# Patient Record
Sex: MALE | Race: WHITE | NOT HISPANIC OR LATINO | Employment: OTHER | URBAN - METROPOLITAN AREA
[De-identification: names, ages, dates, MRNs, and addresses within clinical notes are randomized per-mention and may not be internally consistent; named-entity substitution may affect disease eponyms.]

---

## 2021-06-05 ENCOUNTER — APPOINTMENT (OUTPATIENT)
Dept: MRI IMAGING | Facility: HOSPITAL | Age: 71
End: 2021-06-05

## 2021-06-05 ENCOUNTER — APPOINTMENT (OUTPATIENT)
Dept: CT IMAGING | Facility: HOSPITAL | Age: 71
End: 2021-06-05

## 2021-06-05 ENCOUNTER — HOSPITAL ENCOUNTER (INPATIENT)
Facility: HOSPITAL | Age: 71
LOS: 1 days | Discharge: HOME | End: 2021-06-09
Attending: INTERNAL MEDICINE | Admitting: INTERNAL MEDICINE

## 2021-06-05 ENCOUNTER — APPOINTMENT (OUTPATIENT)
Dept: GENERAL RADIOLOGY | Facility: HOSPITAL | Age: 71
End: 2021-06-05

## 2021-06-05 DIAGNOSIS — R20.0 NUMBNESS AND TINGLING IN LEFT ARM: ICD-10-CM

## 2021-06-05 DIAGNOSIS — N28.9 ACUTE RENAL IMPAIRMENT: ICD-10-CM

## 2021-06-05 DIAGNOSIS — R20.2 NUMBNESS AND TINGLING IN LEFT ARM: ICD-10-CM

## 2021-06-05 DIAGNOSIS — R73.9 HYPERGLYCEMIA: ICD-10-CM

## 2021-06-05 DIAGNOSIS — R20.0 LEFT FACIAL NUMBNESS: Primary | ICD-10-CM

## 2021-06-05 LAB
ALBUMIN SERPL-MCNC: 3.9 G/DL (ref 3.5–5.2)
ALBUMIN/GLOB SERPL: 1.4 G/DL
ALP SERPL-CCNC: 118 U/L (ref 39–117)
ALT SERPL W P-5'-P-CCNC: 27 U/L (ref 1–41)
ANION GAP SERPL CALCULATED.3IONS-SCNC: 13 MMOL/L (ref 5–15)
ARTERIAL PATENCY WRIST A: POSITIVE
AST SERPL-CCNC: 19 U/L (ref 1–40)
ATMOSPHERIC PRESS: ABNORMAL MM[HG]
BASE EXCESS BLDA CALC-SCNC: -2.2 MMOL/L (ref 0–3)
BASOPHILS # BLD AUTO: 0 10*3/MM3 (ref 0–0.2)
BASOPHILS NFR BLD AUTO: 0.4 % (ref 0–1.5)
BDY SITE: ABNORMAL
BILIRUB SERPL-MCNC: 0.4 MG/DL (ref 0–1.2)
BILIRUB UR QL STRIP: NEGATIVE
BUN SERPL-MCNC: 36 MG/DL (ref 8–23)
BUN/CREAT SERPL: 16.7 (ref 7–25)
CALCIUM SPEC-SCNC: 8.4 MG/DL (ref 8.6–10.5)
CHLORIDE SERPL-SCNC: 95 MMOL/L (ref 98–107)
CLARITY UR: CLEAR
CO2 BLDA-SCNC: 25.2 MMOL/L (ref 22–29)
CO2 SERPL-SCNC: 22 MMOL/L (ref 22–29)
COLOR UR: YELLOW
CREAT SERPL-MCNC: 2.16 MG/DL (ref 0.76–1.27)
DEPRECATED RDW RBC AUTO: 41.1 FL (ref 37–54)
EOSINOPHIL # BLD AUTO: 0.1 10*3/MM3 (ref 0–0.4)
EOSINOPHIL NFR BLD AUTO: 1.8 % (ref 0.3–6.2)
ERYTHROCYTE [DISTWIDTH] IN BLOOD BY AUTOMATED COUNT: 13.6 % (ref 12.3–15.4)
GFR SERPL CREATININE-BSD FRML MDRD: 30 ML/MIN/1.73
GLOBULIN UR ELPH-MCNC: 2.7 GM/DL
GLUCOSE BLDC GLUCOMTR-MCNC: 261 MG/DL (ref 70–105)
GLUCOSE BLDC GLUCOMTR-MCNC: 368 MG/DL (ref 70–105)
GLUCOSE BLDC GLUCOMTR-MCNC: 405 MG/DL (ref 70–105)
GLUCOSE SERPL-MCNC: 519 MG/DL (ref 65–99)
GLUCOSE UR STRIP-MCNC: ABNORMAL MG/DL
HCO3 BLDA-SCNC: 23.8 MMOL/L (ref 21–28)
HCT VFR BLD AUTO: 37.8 % (ref 37.5–51)
HEMODILUTION: NO
HGB BLD-MCNC: 12.3 G/DL (ref 13–17.7)
HGB UR QL STRIP.AUTO: NEGATIVE
HOLD SPECIMEN: NORMAL
HOLD SPECIMEN: NORMAL
INHALED O2 CONCENTRATION: 21 %
KETONES UR QL STRIP: NEGATIVE
LEUKOCYTE ESTERASE UR QL STRIP.AUTO: NEGATIVE
LYMPHOCYTES # BLD AUTO: 0.8 10*3/MM3 (ref 0.7–3.1)
LYMPHOCYTES NFR BLD AUTO: 13.6 % (ref 19.6–45.3)
MCH RBC QN AUTO: 28.3 PG (ref 26.6–33)
MCHC RBC AUTO-ENTMCNC: 32.4 G/DL (ref 31.5–35.7)
MCV RBC AUTO: 87.5 FL (ref 79–97)
MODALITY: ABNORMAL
MONOCYTES # BLD AUTO: 0.5 10*3/MM3 (ref 0.1–0.9)
MONOCYTES NFR BLD AUTO: 7.7 % (ref 5–12)
NEUTROPHILS NFR BLD AUTO: 4.6 10*3/MM3 (ref 1.7–7)
NEUTROPHILS NFR BLD AUTO: 76.5 % (ref 42.7–76)
NITRITE UR QL STRIP: NEGATIVE
NRBC BLD AUTO-RTO: 0 /100 WBC (ref 0–0.2)
PCO2 BLDA: 45.1 MM HG (ref 35–48)
PH BLDA: 7.33 PH UNITS (ref 7.35–7.45)
PH UR STRIP.AUTO: 7 [PH] (ref 5–8)
PLATELET # BLD AUTO: 145 10*3/MM3 (ref 140–450)
PMV BLD AUTO: 9.5 FL (ref 6–12)
PO2 BLDA: 34.8 MM HG (ref 83–108)
POTASSIUM SERPL-SCNC: 4.9 MMOL/L (ref 3.5–5.2)
PROT SERPL-MCNC: 6.6 G/DL (ref 6–8.5)
PROT UR QL STRIP: NEGATIVE
RBC # BLD AUTO: 4.32 10*6/MM3 (ref 4.14–5.8)
SAO2 % BLDCOA: 62.4 % (ref 94–98)
SODIUM SERPL-SCNC: 130 MMOL/L (ref 136–145)
SP GR UR STRIP: 1.02 (ref 1–1.03)
TROPONIN T SERPL-MCNC: 0.02 NG/ML (ref 0–0.03)
UROBILINOGEN UR QL STRIP: ABNORMAL
WBC # BLD AUTO: 6 10*3/MM3 (ref 3.4–10.8)
WHOLE BLOOD HOLD SPECIMEN: NORMAL

## 2021-06-05 PROCEDURE — 85025 COMPLETE CBC W/AUTO DIFF WBC: CPT | Performed by: NURSE PRACTITIONER

## 2021-06-05 PROCEDURE — 82962 GLUCOSE BLOOD TEST: CPT

## 2021-06-05 PROCEDURE — 70551 MRI BRAIN STEM W/O DYE: CPT

## 2021-06-05 PROCEDURE — 70498 CT ANGIOGRAPHY NECK: CPT

## 2021-06-05 PROCEDURE — 36600 WITHDRAWAL OF ARTERIAL BLOOD: CPT

## 2021-06-05 PROCEDURE — 72072 X-RAY EXAM THORAC SPINE 3VWS: CPT

## 2021-06-05 PROCEDURE — 72040 X-RAY EXAM NECK SPINE 2-3 VW: CPT

## 2021-06-05 PROCEDURE — 99285 EMERGENCY DEPT VISIT HI MDM: CPT

## 2021-06-05 PROCEDURE — 63710000001 INSULIN GLARGINE PER 5 UNITS: Performed by: INTERNAL MEDICINE

## 2021-06-05 PROCEDURE — 63710000001 INSULIN REGULAR HUMAN PER 5 UNITS: Performed by: NURSE PRACTITIONER

## 2021-06-05 PROCEDURE — 70450 CT HEAD/BRAIN W/O DYE: CPT

## 2021-06-05 PROCEDURE — 0 IOPAMIDOL PER 1 ML: Performed by: NURSE PRACTITIONER

## 2021-06-05 PROCEDURE — 63710000001 INSULIN LISPRO (HUMAN) PER 5 UNITS: Performed by: INTERNAL MEDICINE

## 2021-06-05 PROCEDURE — 81003 URINALYSIS AUTO W/O SCOPE: CPT | Performed by: NURSE PRACTITIONER

## 2021-06-05 PROCEDURE — G0378 HOSPITAL OBSERVATION PER HR: HCPCS

## 2021-06-05 PROCEDURE — 25010000002 HYDROMORPHONE PER 4 MG: Performed by: NURSE PRACTITIONER

## 2021-06-05 PROCEDURE — 84484 ASSAY OF TROPONIN QUANT: CPT | Performed by: NURSE PRACTITIONER

## 2021-06-05 PROCEDURE — 25010000002 ONDANSETRON PER 1 MG: Performed by: NURSE PRACTITIONER

## 2021-06-05 PROCEDURE — 82803 BLOOD GASES ANY COMBINATION: CPT

## 2021-06-05 PROCEDURE — 99220 PR INITIAL OBSERVATION CARE/DAY 70 MINUTES: CPT | Performed by: INTERNAL MEDICINE

## 2021-06-05 PROCEDURE — 80053 COMPREHEN METABOLIC PANEL: CPT | Performed by: NURSE PRACTITIONER

## 2021-06-05 PROCEDURE — 99222 1ST HOSP IP/OBS MODERATE 55: CPT | Performed by: PSYCHIATRY & NEUROLOGY

## 2021-06-05 PROCEDURE — 70496 CT ANGIOGRAPHY HEAD: CPT

## 2021-06-05 PROCEDURE — 4A03X5D MEASUREMENT OF ARTERIAL FLOW, INTRACRANIAL, EXTERNAL APPROACH: ICD-10-PCS | Performed by: RADIOLOGY

## 2021-06-05 PROCEDURE — 72141 MRI NECK SPINE W/O DYE: CPT

## 2021-06-05 PROCEDURE — 93005 ELECTROCARDIOGRAM TRACING: CPT | Performed by: NURSE PRACTITIONER

## 2021-06-05 PROCEDURE — 25010000002 ENOXAPARIN PER 10 MG: Performed by: INTERNAL MEDICINE

## 2021-06-05 RX ORDER — ONDANSETRON 2 MG/ML
4 INJECTION INTRAMUSCULAR; INTRAVENOUS ONCE
Status: COMPLETED | OUTPATIENT
Start: 2021-06-05 | End: 2021-06-05

## 2021-06-05 RX ORDER — CITALOPRAM 20 MG/1
10 TABLET ORAL DAILY
Status: DISCONTINUED | OUTPATIENT
Start: 2021-06-06 | End: 2021-06-09 | Stop reason: HOSPADM

## 2021-06-05 RX ORDER — FUROSEMIDE 20 MG/1
20 TABLET ORAL DAILY
COMMUNITY

## 2021-06-05 RX ORDER — SODIUM CHLORIDE 0.9 % (FLUSH) 0.9 %
10 SYRINGE (ML) INJECTION AS NEEDED
Status: DISCONTINUED | OUTPATIENT
Start: 2021-06-05 | End: 2021-06-09 | Stop reason: HOSPADM

## 2021-06-05 RX ORDER — ASPIRIN 81 MG/1
81 TABLET, CHEWABLE ORAL DAILY
Status: DISCONTINUED | OUTPATIENT
Start: 2021-06-06 | End: 2021-06-09 | Stop reason: HOSPADM

## 2021-06-05 RX ORDER — ASPIRIN 81 MG/1
81 TABLET, CHEWABLE ORAL DAILY
COMMUNITY

## 2021-06-05 RX ORDER — ASPIRIN 300 MG/1
300 SUPPOSITORY RECTAL ONCE
Status: COMPLETED | OUTPATIENT
Start: 2021-06-05 | End: 2021-06-05

## 2021-06-05 RX ORDER — AMLODIPINE BESYLATE 5 MG/1
10 TABLET ORAL DAILY
Status: DISCONTINUED | OUTPATIENT
Start: 2021-06-06 | End: 2021-06-09 | Stop reason: HOSPADM

## 2021-06-05 RX ORDER — INSULIN LISPRO 100 [IU]/ML
0-14 INJECTION, SOLUTION INTRAVENOUS; SUBCUTANEOUS AS NEEDED
Status: DISCONTINUED | OUTPATIENT
Start: 2021-06-05 | End: 2021-06-09 | Stop reason: HOSPADM

## 2021-06-05 RX ORDER — AMLODIPINE BESYLATE 10 MG/1
10 TABLET ORAL DAILY
COMMUNITY

## 2021-06-05 RX ORDER — ONDANSETRON 2 MG/ML
4 INJECTION INTRAMUSCULAR; INTRAVENOUS EVERY 6 HOURS PRN
Status: DISCONTINUED | OUTPATIENT
Start: 2021-06-05 | End: 2021-06-09 | Stop reason: HOSPADM

## 2021-06-05 RX ORDER — CITALOPRAM 20 MG/1
10 TABLET ORAL DAILY
COMMUNITY

## 2021-06-05 RX ORDER — CLOPIDOGREL BISULFATE 75 MG/1
75 TABLET ORAL DAILY
Status: DISCONTINUED | OUTPATIENT
Start: 2021-06-06 | End: 2021-06-09 | Stop reason: HOSPADM

## 2021-06-05 RX ORDER — INSULIN LISPRO 100 [IU]/ML
5 INJECTION, SOLUTION INTRAVENOUS; SUBCUTANEOUS
Status: DISCONTINUED | OUTPATIENT
Start: 2021-06-06 | End: 2021-06-09 | Stop reason: HOSPADM

## 2021-06-05 RX ORDER — HYDROMORPHONE HCL 110MG/55ML
1 PATIENT CONTROLLED ANALGESIA SYRINGE INTRAVENOUS ONCE
Status: COMPLETED | OUTPATIENT
Start: 2021-06-05 | End: 2021-06-05

## 2021-06-05 RX ORDER — HYDROCODONE BITARTRATE AND ACETAMINOPHEN 7.5; 325 MG/1; MG/1
1 TABLET ORAL EVERY 4 HOURS PRN
Status: DISCONTINUED | OUTPATIENT
Start: 2021-06-05 | End: 2021-06-09 | Stop reason: HOSPADM

## 2021-06-05 RX ORDER — SODIUM CHLORIDE 9 MG/ML
75 INJECTION, SOLUTION INTRAVENOUS CONTINUOUS
Status: DISCONTINUED | OUTPATIENT
Start: 2021-06-05 | End: 2021-06-09 | Stop reason: HOSPADM

## 2021-06-05 RX ORDER — ACETAMINOPHEN 325 MG/1
650 TABLET ORAL EVERY 4 HOURS PRN
Status: DISCONTINUED | OUTPATIENT
Start: 2021-06-05 | End: 2021-06-09 | Stop reason: HOSPADM

## 2021-06-05 RX ORDER — GABAPENTIN 100 MG/1
100 CAPSULE ORAL EVERY 8 HOURS SCHEDULED
Status: DISCONTINUED | OUTPATIENT
Start: 2021-06-05 | End: 2021-06-07

## 2021-06-05 RX ORDER — ASPIRIN 325 MG
325 TABLET ORAL DAILY
Status: DISCONTINUED | OUTPATIENT
Start: 2021-06-05 | End: 2021-06-05

## 2021-06-05 RX ORDER — ATORVASTATIN CALCIUM 40 MG/1
40 TABLET, FILM COATED ORAL NIGHTLY
COMMUNITY

## 2021-06-05 RX ORDER — CLONIDINE HYDROCHLORIDE 0.1 MG/1
0.1 TABLET ORAL 2 TIMES DAILY
COMMUNITY

## 2021-06-05 RX ORDER — CLONIDINE HYDROCHLORIDE 0.1 MG/1
0.2 TABLET ORAL EVERY 8 HOURS SCHEDULED
Status: DISCONTINUED | OUTPATIENT
Start: 2021-06-05 | End: 2021-06-09 | Stop reason: HOSPADM

## 2021-06-05 RX ORDER — CLOPIDOGREL BISULFATE 75 MG/1
75 TABLET ORAL DAILY
COMMUNITY

## 2021-06-05 RX ORDER — PANTOPRAZOLE SODIUM 40 MG/1
40 TABLET, DELAYED RELEASE ORAL
Status: DISCONTINUED | OUTPATIENT
Start: 2021-06-06 | End: 2021-06-09 | Stop reason: HOSPADM

## 2021-06-05 RX ORDER — ATORVASTATIN CALCIUM 40 MG/1
40 TABLET, FILM COATED ORAL NIGHTLY
Status: DISCONTINUED | OUTPATIENT
Start: 2021-06-05 | End: 2021-06-09 | Stop reason: HOSPADM

## 2021-06-05 RX ORDER — PANTOPRAZOLE SODIUM 40 MG/1
40 TABLET, DELAYED RELEASE ORAL DAILY
COMMUNITY

## 2021-06-05 RX ORDER — SODIUM CHLORIDE 0.9 % (FLUSH) 0.9 %
10 SYRINGE (ML) INJECTION EVERY 12 HOURS SCHEDULED
Status: DISCONTINUED | OUTPATIENT
Start: 2021-06-05 | End: 2021-06-09 | Stop reason: HOSPADM

## 2021-06-05 RX ORDER — NICOTINE POLACRILEX 4 MG
15 LOZENGE BUCCAL
Status: DISCONTINUED | OUTPATIENT
Start: 2021-06-05 | End: 2021-06-09 | Stop reason: HOSPADM

## 2021-06-05 RX ORDER — INSULIN GLARGINE 100 [IU]/ML
45 INJECTION, SOLUTION SUBCUTANEOUS NIGHTLY
Status: DISCONTINUED | OUTPATIENT
Start: 2021-06-05 | End: 2021-06-09 | Stop reason: HOSPADM

## 2021-06-05 RX ORDER — INSULIN LISPRO 100 [IU]/ML
0-14 INJECTION, SOLUTION INTRAVENOUS; SUBCUTANEOUS
Status: DISCONTINUED | OUTPATIENT
Start: 2021-06-05 | End: 2021-06-09 | Stop reason: HOSPADM

## 2021-06-05 RX ORDER — DEXTROSE MONOHYDRATE 25 G/50ML
25 INJECTION, SOLUTION INTRAVENOUS
Status: DISCONTINUED | OUTPATIENT
Start: 2021-06-05 | End: 2021-06-09 | Stop reason: HOSPADM

## 2021-06-05 RX ADMIN — IOPAMIDOL 100 ML: 755 INJECTION, SOLUTION INTRAVENOUS at 17:03

## 2021-06-05 RX ADMIN — SODIUM CHLORIDE 1000 ML: 9 INJECTION, SOLUTION INTRAVENOUS at 17:09

## 2021-06-05 RX ADMIN — INSULIN GLARGINE 45 UNITS: 100 INJECTION, SOLUTION SUBCUTANEOUS at 23:07

## 2021-06-05 RX ADMIN — HYDROMORPHONE HYDROCHLORIDE 1 MG: 2 INJECTION, SOLUTION INTRAMUSCULAR; INTRAVENOUS; SUBCUTANEOUS at 19:01

## 2021-06-05 RX ADMIN — INSULIN LISPRO 8 UNITS: 100 INJECTION, SOLUTION INTRAVENOUS; SUBCUTANEOUS at 23:06

## 2021-06-05 RX ADMIN — ASPIRIN 300 MG: 300 SUPPOSITORY RECTAL at 20:05

## 2021-06-05 RX ADMIN — Medication 10 ML: at 23:06

## 2021-06-05 RX ADMIN — ONDANSETRON 4 MG: 2 INJECTION INTRAMUSCULAR; INTRAVENOUS at 19:01

## 2021-06-05 RX ADMIN — SODIUM CHLORIDE 75 ML/HR: 9 INJECTION, SOLUTION INTRAVENOUS at 23:12

## 2021-06-05 RX ADMIN — INSULIN HUMAN 8 UNITS: 100 INJECTION, SOLUTION PARENTERAL at 17:49

## 2021-06-05 RX ADMIN — SODIUM CHLORIDE 1000 ML: 9 INJECTION, SOLUTION INTRAVENOUS at 17:49

## 2021-06-05 RX ADMIN — ENOXAPARIN SODIUM 40 MG: 40 INJECTION SUBCUTANEOUS at 23:06

## 2021-06-05 NOTE — CONSULTS
Primary Care Provider: Crow Copeland DO     Consult requested by: RONAN Gonzalez    Reason for Consultation: Neurological evaluation for acute stroke,  Code stroke    Papito Gallo is a 70 y.o. male *    History taken from: patient chart RN    Chief complaint: left sided numbness and tingling.        SUBJECTIVE:    History of present illness:  The patient is a 70 year old gentleman without any past medical history on file who presented to ER of [facility] secondary to pain in the cervical and thoracic spine region.  It started about a week ago and gotten worse.  He was seen by a chiropractic physician on last Tuesday and Thursday. His treatment did not helped the pain. The pain is described as being severe and sharp.  For the last 2 days he has noticed numbness over left forearm and 4th and 5th ring fingers.  Today around 11:00 am he noticed numbness and tingling around left side of mouth.  The patient came to ER of [facility].  A code stroke was called.  He was not in window period to receive tPA.  The emergent CT of Head and CTA of Head and Neck reviewed by me were unremarkable.  The patient denies any speech and swallowing problems and focal weakness.     Review of Systems   Constitutional: Negative  HENT: Negative.    Eyes: Negative.    Respiratory: Negative.    Cardiovascular: Negative.    Gastrointestinal: Negative.    Genitourinary: Negative.    Musculoskeletal: pain in the upper back  Skin: Negative.    Neurological: sensory paresthesias.    Hematological: Negative.    Psychiatric/Behavioral: Negative.        PATIENT HISTORY:  No past medical history on file., No past surgical history on file., No family history on file.,   Social History     Tobacco Use   • Smoking status: Not on file   Substance Use Topics   • Alcohol use: Not on file   • Drug use: Not on file   , (Not in a hospital admission)  , Scheduled Meds:  insulin regular, 8 Units, Intravenous, Once  sodium chloride, 1,000 mL, Intravenous,  Once    , Continuous Infusions:   , PRN Meds:  sodium chloride  •  [COMPLETED] Insert peripheral IV **AND** sodium chloride, Allergies:  Bactrim [sulfamethoxazole-trimethoprim]    ________________________________________________________        OBJECTIVE:  Upon today's exam:  The patient is lying in bed in no apparent distress.  Head NC, AT, Neck supple, trachea midline.  Lungs CTA, good pulmonary effort.  CV  S1-S2 no murmur.  Abdomen soft. Non tender.  Ext no edema.          Neurologic Exam  The patient is awake, alert, oriented x 3, speech is fluent with good comprehension.  Follow commands, name common objects.  CN VFFC, EOMI, no facial droop. Facial sensation intact on gross finger rub.  Tongue midline.  Motor 5/5.  Sensory light touch intact.  Reflexes +, plantar mute.  Cerebellum finger to nose intact.   ________________________________________________________   RESULTS REVIEW:    VITAL SIGNS:   Temp:  [98.7 °F (37.1 °C)] 98.7 °F (37.1 °C)  Heart Rate:  [69-74] 69  Resp:  [22] 22  BP: (129-135)/(58-97) 129/58     LABS:  WBC   Date Value Ref Range Status   06/05/2021 6.00 3.40 - 10.80 10*3/mm3 Final     RBC   Date Value Ref Range Status   06/05/2021 4.32 4.14 - 5.80 10*6/mm3 Final     Hemoglobin   Date Value Ref Range Status   06/05/2021 12.3 (L) 13.0 - 17.7 g/dL Final     Hematocrit   Date Value Ref Range Status   06/05/2021 37.8 37.5 - 51.0 % Final     MCV   Date Value Ref Range Status   06/05/2021 87.5 79.0 - 97.0 fL Final     MCH   Date Value Ref Range Status   06/05/2021 28.3 26.6 - 33.0 pg Final     MCHC   Date Value Ref Range Status   06/05/2021 32.4 31.5 - 35.7 g/dL Final     RDW   Date Value Ref Range Status   06/05/2021 13.6 12.3 - 15.4 % Final     RDW-SD   Date Value Ref Range Status   06/05/2021 41.1 37.0 - 54.0 fl Final     MPV   Date Value Ref Range Status   06/05/2021 9.5 6.0 - 12.0 fL Final     Platelets   Date Value Ref Range Status   06/05/2021 145 140 - 450 10*3/mm3 Final     Neutrophil %    Date Value Ref Range Status   06/05/2021 76.5 (H) 42.7 - 76.0 % Final     Lymphocyte %   Date Value Ref Range Status   06/05/2021 13.6 (L) 19.6 - 45.3 % Final     Monocyte %   Date Value Ref Range Status   06/05/2021 7.7 5.0 - 12.0 % Final     Eosinophil %   Date Value Ref Range Status   06/05/2021 1.8 0.3 - 6.2 % Final     Basophil %   Date Value Ref Range Status   06/05/2021 0.4 0.0 - 1.5 % Final     Neutrophils, Absolute   Date Value Ref Range Status   06/05/2021 4.60 1.70 - 7.00 10*3/mm3 Final     Lymphocytes, Absolute   Date Value Ref Range Status   06/05/2021 0.80 0.70 - 3.10 10*3/mm3 Final     Monocytes, Absolute   Date Value Ref Range Status   06/05/2021 0.50 0.10 - 0.90 10*3/mm3 Final     Eosinophils, Absolute   Date Value Ref Range Status   06/05/2021 0.10 0.00 - 0.40 10*3/mm3 Final     Basophils, Absolute   Date Value Ref Range Status   06/05/2021 0.00 0.00 - 0.20 10*3/mm3 Final     nRBC   Date Value Ref Range Status   06/05/2021 0.0 0.0 - 0.2 /100 WBC Final     Glucose   Date Value Ref Range Status   06/05/2021 519 (C) 65 - 99 mg/dL Final     BUN   Date Value Ref Range Status   06/05/2021 36 (H) 8 - 23 mg/dL Final     Creatinine   Date Value Ref Range Status   06/05/2021 2.16 (H) 0.76 - 1.27 mg/dL Final     Sodium   Date Value Ref Range Status   06/05/2021 130 (L) 136 - 145 mmol/L Final     Potassium   Date Value Ref Range Status   06/05/2021 4.9 3.5 - 5.2 mmol/L Final     Chloride   Date Value Ref Range Status   06/05/2021 95 (L) 98 - 107 mmol/L Final     CO2   Date Value Ref Range Status   06/05/2021 22.0 22.0 - 29.0 mmol/L Final     Calcium   Date Value Ref Range Status   06/05/2021 8.4 (L) 8.6 - 10.5 mg/dL Final     Total Protein   Date Value Ref Range Status   06/05/2021 6.6 6.0 - 8.5 g/dL Final     Albumin   Date Value Ref Range Status   06/05/2021 3.90 3.50 - 5.20 g/dL Final     ALT (SGPT)   Date Value Ref Range Status   06/05/2021 27 1 - 41 U/L Final     AST (SGOT)   Date Value Ref Range Status    06/05/2021 19 1 - 40 U/L Final     Alkaline Phosphatase   Date Value Ref Range Status   06/05/2021 118 (H) 39 - 117 U/L Final     Total Bilirubin   Date Value Ref Range Status   06/05/2021 0.4 0.0 - 1.2 mg/dL Final     eGFR Non  Amer   Date Value Ref Range Status   06/05/2021 30 (L) >60 mL/min/1.73 Final     BUN/Creatinine Ratio   Date Value Ref Range Status   06/05/2021 16.7 7.0 - 25.0 Final     Anion Gap   Date Value Ref Range Status   06/05/2021 13.0 5.0 - 15.0 mmol/L Final       Lab Results   Component Value Date    HGBA1C 9.1 (H) 04/07/2021         IMAGING STUDIES:  XR Spine Cervical 2 or 3 View    Result Date: 6/5/2021  No acute osseous abnormality within the visualized spine. Mild to moderate multilevel degenerative changes are present throughout the spine.  Electronically Signed By-Vangie Thapa MD On:6/5/2021 5:19 PM This report was finalized on [id number] by  Vangie Thapa MD.    XR Spine Thoracic 3 View    Result Date: 6/5/2021  No acute osseous abnormality. Mild to moderate degenerative changes are present.  Electronically Signed By-Vangie Thapa MD On:6/5/2021 5:19 PM This report was finalized on [id number] by  Vangei Thapa MD.    CT Head Without Contrast    Result Date: 6/5/2021  No evidence of hemorrhage, mass effect or midline shift. No acute process identified.  Electronically Signed By-Vangie Thapa MD On:6/5/2021 5:20 PM This report was finalized on [id number] by  Vangie Thapa MD.      I reviewed the patient's new clinical results.      ________________________________________________________     PROBLEM LIST:    * No active hospital problems. *          Assessment/Plan   ASSESSMENT/PLAN:  The patient is a 70 year old gentleman without any significant medical history on record who has been having pain in the upper back and developed numbness in the left arm in ulnar distribution.  He also has had numbness of the left side of lip that developed around 11:00 am today.  The patient  may have small right sided lacunar infraction.  He was not a candidate for tPA secondary to being out of window period at the time of presentation.  The other diagnostic considerations include cervical radiculopathy. The blood work revealed blood sugar more than 500 and HbA1C above 8.   Rec: MRI of Brain without contrast.  MRI of Cervical spine without contrast.  2-D echocardiogram.  Blood work including Vitamin B12, TSH, lipid profile.  Start  mg po q day.  Will need work up for diabetes as per hospitalist MD.  Will follow.     Modification of stroke risk factors:   - Blood pressure should be less than 130/80 outpatient, HbA1c less than 6.5, LDL less than 70; b12>500 and smoking cessation if applicable. We would be grateful if the primary team / primary care physician would keep a close watch on the above targets.  - Stroke education  - Follow up with neurologist of choice      I discussed the patient's findings and my recommendations with patient and nursing staff    Bushra Lin MD  06/05/21  17:44 EDT

## 2021-06-05 NOTE — ED PROVIDER NOTES
Subjective   History:    70-year-old male presents the emergency department today with complaints of cervical and thoracic spine pain that has been present for the last week and has been getting worse.  Patient reports he has been going to the chiropractor and saw him on Tuesday and Thursday for this pain that is between his shoulder blades.  It has not helped.  Today he has 3 fingers on the left arm that are numb and tingly in the left side of his bottom lip is numb as well.  Patient has a history of a lumbar fusion in 1998 but no prior history of pain in his neck or thoracic spine.  Patient has a history of hyperlipidemia, hypertension, diabetes.  No history of stroke.  Patient also had polio when he was younger and has chronic weakness of the left lower extremity.  Patient reports mild headache.  States that the symptoms started sometime after 11 AM.  He was at a memorial service for his grandson.  Cannot give me a specific time.    Onset: 1 week  Location: Cervical, thoracic spine  Duration: Continuous  Character: Sharp  Aggravating/Alleviating factors: Exacerbated with movement, no identified alleviating factors  Radiation:, Left arm  Severity: Severe            Review of Systems   Constitutional: Negative for appetite change, chills, fatigue and fever.   HENT: Negative for congestion, facial swelling, sinus pain and sore throat.    Eyes: Negative for pain and visual disturbance.   Respiratory: Negative for cough, chest tightness and shortness of breath.    Cardiovascular: Negative for chest pain, palpitations and leg swelling.   Gastrointestinal: Negative for constipation, diarrhea, nausea and vomiting.   Genitourinary: Negative for dysuria, flank pain, frequency and urgency.   Musculoskeletal: Positive for back pain. Negative for arthralgias, joint swelling and neck pain.   Skin: Negative for color change and rash.   Neurological: Positive for numbness. Negative for dizziness, seizures, syncope, weakness,  light-headedness and headaches.       No past medical history on file.    Allergies   Allergen Reactions   • Bactrim [Sulfamethoxazole-Trimethoprim] Rash       No past surgical history on file.    No family history on file.    Social History     Socioeconomic History   • Marital status:      Spouse name: Not on file   • Number of children: Not on file   • Years of education: Not on file   • Highest education level: Not on file           Objective   Physical Exam  Constitutional:       Appearance: He is normal weight.   HENT:      Head: Normocephalic and atraumatic.      Mouth/Throat:      Mouth: Mucous membranes are moist.   Eyes:      General: No visual field deficit.     Extraocular Movements: Extraocular movements intact.      Conjunctiva/sclera: Conjunctivae normal.      Pupils: Pupils are equal, round, and reactive to light.   Neck:      Vascular: No carotid bruit.   Cardiovascular:      Rate and Rhythm: Normal rate and regular rhythm.      Pulses: Normal pulses.      Heart sounds: Normal heart sounds.   Pulmonary:      Effort: Pulmonary effort is normal.      Breath sounds: Normal breath sounds.   Abdominal:      General: Abdomen is flat. Bowel sounds are normal. There is no distension.      Palpations: Abdomen is soft.      Tenderness: There is no abdominal tenderness. There is no guarding.   Musculoskeletal:         General: No swelling, tenderness or deformity. Normal range of motion.      Cervical back: Neck supple. No rigidity or tenderness.      Right lower leg: No edema.      Left lower leg: No edema.   Lymphadenopathy:      Cervical: No cervical adenopathy.   Skin:     General: Skin is warm and dry.      Capillary Refill: Capillary refill takes less than 2 seconds.   Neurological:      Mental Status: He is alert and oriented to person, place, and time.      GCS: GCS eye subscore is 4. GCS verbal subscore is 5. GCS motor subscore is 6.      Cranial Nerves: Facial asymmetry present. No dysarthria.  "     Sensory: Sensory deficit present.      Motor: Weakness and atrophy present. No tremor or pronator drift.      Coordination: Coordination is intact.      Comments: Slight droop of L lip, weakness/atrophy of LLE but patient states is normal for him, had polio as child   Psychiatric:         Mood and Affect: Mood normal.         Behavior: Behavior normal.         Thought Content: Thought content normal.         Procedures           ED Course  ED Course as of Jun 05 1919   Sat Jun 05, 2021   1620 Spoke with Dr. Zambrano regarding patient's presentation and exam findings.  Will call Code Stroke.    [AR]   1643 Spoke with Dr. Lin regarding patient's presentation - agreed with plan for CT head, CTA head/neck, will be in to see patient.  Is not candidate for TPA due to timing - patient reports \"sometime after 11am\"    [AR]   1730 Patient seen along with nurse practitioner.  He is describing some left arm numbness as well as some left facial numbness.  States facial numbness started today in the left arm numbness started yesterday.  Code stroke was initiated and patient seen by neurology.  Awaiting results.  Patient will be admitted to the [facility] for further stroke evaluation.    [SH]      ED Course User Index  [AR] Susu Rizvi APRN  [SH] Kameron Zambrano MD            Medications   sodium chloride 0.9 % flush 10 mL (has no administration in time range)   sodium chloride 0.9 % flush 10 mL (has no administration in time range)   aspirin suppository 300 mg (has no administration in time range)   sodium chloride 0.9 % bolus 1,000 mL (0 mL Intravenous Stopped 6/5/21 1745)   iopamidol (ISOVUE-370) 76 % injection 100 mL (100 mL Intravenous Given 6/5/21 1703)   insulin regular (humuLIN R,novoLIN R) injection 8 Units (8 Units Intravenous Given 6/5/21 1749)   sodium chloride 0.9 % bolus 1,000 mL (1,000 mL Intravenous New Bag 6/5/21 1749)   HYDROmorphone (DILAUDID) injection 1 mg (1 mg Intravenous Given 6/5/21 1901) "   ondansetron (ZOFRAN) injection 4 mg (4 mg Intravenous Given 6/5/21 1901)     Labs Reviewed   COMPREHENSIVE METABOLIC PANEL - Abnormal; Notable for the following components:       Result Value    Glucose 519 (*)     BUN 36 (*)     Creatinine 2.16 (*)     Sodium 130 (*)     Chloride 95 (*)     Calcium 8.4 (*)     Alkaline Phosphatase 118 (*)     eGFR Non  Amer 30 (*)     All other components within normal limits    Narrative:     GFR Normal >60  Chronic Kidney Disease <60  Kidney Failure <15     CBC WITH AUTO DIFFERENTIAL - Abnormal; Notable for the following components:    Hemoglobin 12.3 (*)     Neutrophil % 76.5 (*)     Lymphocyte % 13.6 (*)     All other components within normal limits   URINALYSIS W/ CULTURE IF INDICATED - Abnormal; Notable for the following components:    Glucose, UA >=1000 mg/dL (3+) (*)     All other components within normal limits    Narrative:     Urine microscopic not indicated.   BLOOD GAS, ARTERIAL - Abnormal; Notable for the following components:    pH, Arterial 7.330 (*)     pO2, Arterial 34.8 (*)     Base Excess, Arterial -2.2 (*)     O2 Saturation, Arterial 62.4 (*)     All other components within normal limits    Narrative:     Results look venous   POCT GLUCOSE FINGERSTICK - Abnormal; Notable for the following components:    Glucose 405 (*)     All other components within normal limits   POCT GLUCOSE FINGERSTICK - Abnormal; Notable for the following components:    Glucose 368 (*)     All other components within normal limits   TROPONIN (IN-HOUSE) - Normal    Narrative:     Troponin T Reference Range:  <= 0.03 ng/mL-   Negative for AMI  >0.03 ng/mL-     Abnormal for myocardial necrosis.  Clinicians would have to utilize clinical acumen, EKG, Troponin and serial changes to determine if it is an Acute Myocardial Infarction or myocardial injury due to an underlying chronic condition.       Results may be falsely decreased if patient taking Biotin.     RAINBOW DRAW    Narrative:      The following orders were created for panel order Raymond Draw.  Procedure                               Abnormality         Status                     ---------                               -----------         ------                     Light Blue Top[[id number]]                                   Final result               Green Top (Gel)[[id number]]                                  Final result               Lavender Top[[id number]]                                     Final result               Gold Top - SST[[id number]]                                   Final result                 Please view results for these tests on the individual orders.   BLOOD GAS, ARTERIAL   POCT GLUCOSE FINGERSTICK   LIGHT BLUE TOP   GREEN TOP   LAVENDER TOP   GOLD TOP - SST   CBC AND DIFFERENTIAL    Narrative:     The following orders were created for panel order CBC & Differential.  Procedure                               Abnormality         Status                     ---------                               -----------         ------                     CBC Auto Differential[[id number]]        Abnormal            Final result                 Please view results for these tests on the individual orders.     XR Spine Cervical 2 or 3 View   Final Result   No acute osseous abnormality within the visualized spine. Mild to   moderate multilevel degenerative changes are present throughout the   spine.       Electronically Signed By-Vangie Thapa MD On:6/5/2021 5:19 PM   This report was finalized on [id number] by  Vangie Thapa MD.      XR Spine Thoracic 3 View   Final Result   No acute osseous abnormality. Mild to moderate degenerative changes are   present.       Electronically Signed By-Vangie Thapa MD On:6/5/2021 5:19 PM   This report was finalized on [id number] by  Vangie Thapa MD.      CT Angiogram Head w AI Analysis of LVO         CT Head Without Contrast   Final Result   No evidence of hemorrhage, mass effect or midline shift. No  acute   process identified.       Electronically Signed By-Vangie Thapa MD On:6/5/2021 5:20 PM   This report was finalized on [id number] by  Vangie Thapa MD.      CT Angiogram Neck    (Results Pending)   MRI Brain With Contrast    (Results Pending)   MRI Cervical Spine With & Without Contrast    (Results Pending)                                       MDM  Number of Diagnoses or Management Options  Acute renal impairment  Hyperglycemia  Left facial numbness  Numbness and tingling in left arm  Diagnosis management comments: I examined the patient using the appropriate personal protective equipment.      DISPOSITION:   Chart Review:  Comorbidity:  has no past medical history on file.  Differentials:this list is not all inclusive and does not constitute the entirety of considered causes -->   ECG: interpreted by ER physician and reviewed by myself:   Labs:     Imaging: Was interpreted by physician and reviewed by myself:  XR Spine Cervical 2 or 3 View    Result Date: 6/5/2021  No acute osseous abnormality within the visualized spine. Mild to moderate multilevel degenerative changes are present throughout the spine.  Electronically Signed By-Vangie Thapa MD On:6/5/2021 5:19 PM This report was finalized on [id number] by  Vangie Thapa MD.    XR Spine Thoracic 3 View    Result Date: 6/5/2021  No acute osseous abnormality. Mild to moderate degenerative changes are present.  Electronically Signed By-Vangie Thapa MD On:6/5/2021 5:19 PM This report was finalized on [id number] by  Vangie Thapa MD.    CT Head Without Contrast    Result Date: 6/5/2021  No evidence of hemorrhage, mass effect or midline shift. No acute process identified.  Electronically Signed By-Vangie Thapa MD On:6/5/2021 5:20 PM This report was finalized on [id number] by  Vangie Thapa MD.      Disposition/Treatment:    70-year-old male presents the emergency department today with complaints of back pain, neck pain, left arm numbness and tingling,  left face numbness.  Patient reported that the facial numbness started approximately 11 AM the left arm numbness started yesterday.  Patient, on exam, does have a slight facial droop.  Code stroke was called and patient had CT of the head and CTA of the head and neck that were negative.  Dr. Lin saw the patient and ordered an MRI of the brain and MRI of the cervical spine.  IV was established and labs were obtained and are as noted above.  Patient does have a history of diabetes, hypertension, hyperlipidemia.  Patient reports that he had a lot of sweets this afternoon prior to arrival.  Patient was given 2 L of normal saline and 8 units of insulin IV in the emergency department for his hyperglycemia, which improved at time of admittance.  Patient was given morphine for his pain in the emergency department.  It is important to note that his ABG was a venous specimen.  Patient was in no respiratory distress in the emergency department.  Discussed findings with patient.  Dr. Zambrano was involved in the entirety of the patient's care and also evaluated him while in the emergency department.  Patient will be admitted to [facility] for further evaluation and treatment.  Patient is in agreement with plan.  Hospitalist is in agreement with admittance.         Amount and/or Complexity of Data Reviewed  Clinical lab tests: reviewed  Tests in the radiology section of CPT®: reviewed  Tests in the medicine section of CPT®: reviewed  Decide to obtain previous medical records or to obtain history from someone other than the patient: yes        Final diagnoses:   Left facial numbness   Numbness and tingling in left arm   Hyperglycemia   Acute renal impairment       ED Disposition  ED Disposition     ED Disposition Condition Comment    Decision to Admit  Level of Care: Telemetry [5]   Diagnosis: Left facial numbness [008295]   Admitting Physician: DOC RAE [[id number]]   Attending Physician: DOC RAE [[id number]]   Bed Request Comments:  [facility]            No follow-up provider specified.       Medication List      No changes were made to your prescriptions during this visit.          Susu Rizvi, RONAN  06/05/21 192

## 2021-06-06 LAB
ANION GAP SERPL CALCULATED.3IONS-SCNC: 7 MMOL/L (ref 5–15)
BUN SERPL-MCNC: 27 MG/DL (ref 8–23)
BUN/CREAT SERPL: 14.7 (ref 7–25)
CALCIUM SPEC-SCNC: 8.5 MG/DL (ref 8.6–10.5)
CHLORIDE SERPL-SCNC: 105 MMOL/L (ref 98–107)
CHOLEST SERPL-MCNC: 93 MG/DL (ref 0–200)
CO2 SERPL-SCNC: 27 MMOL/L (ref 22–29)
CREAT SERPL-MCNC: 1.84 MG/DL (ref 0.76–1.27)
GFR SERPL CREATININE-BSD FRML MDRD: 36 ML/MIN/1.73
GLUCOSE BLDC GLUCOMTR-MCNC: 108 MG/DL (ref 70–105)
GLUCOSE BLDC GLUCOMTR-MCNC: 111 MG/DL (ref 70–105)
GLUCOSE BLDC GLUCOMTR-MCNC: 169 MG/DL (ref 70–105)
GLUCOSE BLDC GLUCOMTR-MCNC: 195 MG/DL (ref 70–105)
GLUCOSE BLDC GLUCOMTR-MCNC: 228 MG/DL (ref 70–105)
GLUCOSE SERPL-MCNC: 136 MG/DL (ref 65–99)
HBA1C MFR BLD: 9.8 % (ref 3.5–5.6)
HDLC SERPL-MCNC: 34 MG/DL (ref 40–60)
LDLC SERPL CALC-MCNC: 38 MG/DL (ref 0–100)
LDLC/HDLC SERPL: 1.05 {RATIO}
POTASSIUM SERPL-SCNC: 4.2 MMOL/L (ref 3.5–5.2)
QT INTERVAL: 452 MS
SARS-COV-2 RNA PNL SPEC NAA+PROBE: NOT DETECTED
SODIUM SERPL-SCNC: 139 MMOL/L (ref 136–145)
TRIGL SERPL-MCNC: 117 MG/DL (ref 0–150)
TSH SERPL DL<=0.05 MIU/L-ACNC: 3.51 UIU/ML (ref 0.27–4.2)
VIT B12 BLD-MCNC: 387 PG/ML (ref 211–946)
VLDLC SERPL-MCNC: 21 MG/DL (ref 5–40)

## 2021-06-06 PROCEDURE — 99232 SBSQ HOSP IP/OBS MODERATE 35: CPT | Performed by: PSYCHIATRY & NEUROLOGY

## 2021-06-06 PROCEDURE — 36415 COLL VENOUS BLD VENIPUNCTURE: CPT | Performed by: INTERNAL MEDICINE

## 2021-06-06 PROCEDURE — 63710000001 INSULIN LISPRO (HUMAN) PER 5 UNITS: Performed by: INTERNAL MEDICINE

## 2021-06-06 PROCEDURE — 92610 EVALUATE SWALLOWING FUNCTION: CPT

## 2021-06-06 PROCEDURE — 25010000002 LORAZEPAM PER 2 MG: Performed by: HOSPITALIST

## 2021-06-06 PROCEDURE — 97161 PT EVAL LOW COMPLEX 20 MIN: CPT

## 2021-06-06 PROCEDURE — G0378 HOSPITAL OBSERVATION PER HR: HCPCS

## 2021-06-06 PROCEDURE — 80061 LIPID PANEL: CPT | Performed by: PSYCHIATRY & NEUROLOGY

## 2021-06-06 PROCEDURE — U0003 INFECTIOUS AGENT DETECTION BY NUCLEIC ACID (DNA OR RNA); SEVERE ACUTE RESPIRATORY SYNDROME CORONAVIRUS 2 (SARS-COV-2) (CORONAVIRUS DISEASE [COVID-19]), AMPLIFIED PROBE TECHNIQUE, MAKING USE OF HIGH THROUGHPUT TECHNOLOGIES AS DESCRIBED BY CMS-2020-01-R: HCPCS | Performed by: HOSPITALIST

## 2021-06-06 PROCEDURE — 83036 HEMOGLOBIN GLYCOSYLATED A1C: CPT | Performed by: HOSPITALIST

## 2021-06-06 PROCEDURE — 82607 VITAMIN B-12: CPT | Performed by: PSYCHIATRY & NEUROLOGY

## 2021-06-06 PROCEDURE — 25010000002 ENOXAPARIN PER 10 MG: Performed by: INTERNAL MEDICINE

## 2021-06-06 PROCEDURE — 80048 BASIC METABOLIC PNL TOTAL CA: CPT | Performed by: INTERNAL MEDICINE

## 2021-06-06 PROCEDURE — 99225 PR SBSQ OBSERVATION CARE/DAY 25 MINUTES: CPT | Performed by: HOSPITALIST

## 2021-06-06 PROCEDURE — 84443 ASSAY THYROID STIM HORMONE: CPT | Performed by: PSYCHIATRY & NEUROLOGY

## 2021-06-06 PROCEDURE — U0005 INFEC AGEN DETEC AMPLI PROBE: HCPCS | Performed by: HOSPITALIST

## 2021-06-06 PROCEDURE — 82962 GLUCOSE BLOOD TEST: CPT

## 2021-06-06 PROCEDURE — 63710000001 INSULIN GLARGINE PER 5 UNITS: Performed by: INTERNAL MEDICINE

## 2021-06-06 RX ORDER — LIDOCAINE 50 MG/G
1 PATCH TOPICAL
Status: DISCONTINUED | OUTPATIENT
Start: 2021-06-06 | End: 2021-06-09 | Stop reason: HOSPADM

## 2021-06-06 RX ORDER — DEXTROSE MONOHYDRATE 25 G/50ML
50 INJECTION, SOLUTION INTRAVENOUS ONCE
Status: COMPLETED | OUTPATIENT
Start: 2021-06-06 | End: 2021-06-06

## 2021-06-06 RX ORDER — CYCLOBENZAPRINE HCL 10 MG
10 TABLET ORAL 3 TIMES DAILY
Status: DISCONTINUED | OUTPATIENT
Start: 2021-06-06 | End: 2021-06-09 | Stop reason: HOSPADM

## 2021-06-06 RX ORDER — LORAZEPAM 2 MG/ML
1 INJECTION INTRAMUSCULAR ONCE
Status: DISCONTINUED | OUTPATIENT
Start: 2021-06-06 | End: 2021-06-06

## 2021-06-06 RX ORDER — LORAZEPAM 2 MG/ML
1 INJECTION INTRAMUSCULAR EVERY 6 HOURS PRN
Status: DISCONTINUED | OUTPATIENT
Start: 2021-06-06 | End: 2021-06-09 | Stop reason: HOSPADM

## 2021-06-06 RX ADMIN — LORAZEPAM 1 MG: 2 INJECTION INTRAMUSCULAR; INTRAVENOUS at 10:45

## 2021-06-06 RX ADMIN — CLONIDINE HYDROCHLORIDE 0.2 MG: 0.1 TABLET ORAL at 14:16

## 2021-06-06 RX ADMIN — HYDROCODONE BITARTRATE AND ACETAMINOPHEN 1 TABLET: 7.5; 325 TABLET ORAL at 14:23

## 2021-06-06 RX ADMIN — AMLODIPINE BESYLATE 10 MG: 5 TABLET ORAL at 14:22

## 2021-06-06 RX ADMIN — INSULIN LISPRO 3 UNITS: 100 INJECTION, SOLUTION INTRAVENOUS; SUBCUTANEOUS at 17:34

## 2021-06-06 RX ADMIN — HYDROCODONE BITARTRATE AND ACETAMINOPHEN 1 TABLET: 7.5; 325 TABLET ORAL at 20:37

## 2021-06-06 RX ADMIN — ASPIRIN 81 MG: 81 TABLET, CHEWABLE ORAL at 14:22

## 2021-06-06 RX ADMIN — Medication 10 ML: at 10:24

## 2021-06-06 RX ADMIN — ATORVASTATIN CALCIUM 40 MG: 40 TABLET, FILM COATED ORAL at 20:37

## 2021-06-06 RX ADMIN — ENOXAPARIN SODIUM 40 MG: 40 INJECTION SUBCUTANEOUS at 17:33

## 2021-06-06 RX ADMIN — Medication 10 ML: at 20:38

## 2021-06-06 RX ADMIN — CITALOPRAM HYDROBROMIDE 10 MG: 20 TABLET ORAL at 14:16

## 2021-06-06 RX ADMIN — CLOPIDOGREL BISULFATE 75 MG: 75 TABLET ORAL at 14:16

## 2021-06-06 RX ADMIN — CLONIDINE HYDROCHLORIDE 0.2 MG: 0.1 TABLET ORAL at 20:37

## 2021-06-06 RX ADMIN — LIDOCAINE 1 PATCH: 50 PATCH TOPICAL at 09:26

## 2021-06-06 RX ADMIN — CYCLOBENZAPRINE HYDROCHLORIDE 10 MG: 10 TABLET, FILM COATED ORAL at 15:47

## 2021-06-06 RX ADMIN — GABAPENTIN 100 MG: 100 CAPSULE ORAL at 14:16

## 2021-06-06 RX ADMIN — INSULIN GLARGINE 45 UNITS: 100 INJECTION, SOLUTION SUBCUTANEOUS at 20:39

## 2021-06-06 RX ADMIN — DEXTROSE MONOHYDRATE 50 ML: 25 INJECTION, SOLUTION INTRAVENOUS at 10:21

## 2021-06-06 RX ADMIN — CYCLOBENZAPRINE HYDROCHLORIDE 10 MG: 10 TABLET, FILM COATED ORAL at 20:37

## 2021-06-06 RX ADMIN — INSULIN LISPRO 5 UNITS: 100 INJECTION, SOLUTION INTRAVENOUS; SUBCUTANEOUS at 17:33

## 2021-06-06 RX ADMIN — INSULIN LISPRO 5 UNITS: 100 INJECTION, SOLUTION INTRAVENOUS; SUBCUTANEOUS at 14:18

## 2021-06-06 RX ADMIN — CANAGLIFLOZIN 100 MG: 300 TABLET, FILM COATED ORAL at 14:17

## 2021-06-06 RX ADMIN — GABAPENTIN 100 MG: 100 CAPSULE ORAL at 20:38

## 2021-06-06 NOTE — THERAPY EVALUATION
Acute Care - Speech Language Pathology   Swallow Initial Evaluation  [facility]     Patient Name: Papito Gallo  : 1950  MRN: [id number]  Today's Date: 2021               Admit Date: 2021  Patient was not wearing a face mask during this therapy encounter. Therapist used appropriate personal protective equipment including mask, eye protection and gloves.  Mask used was standard procedure mask. Appropriate PPE was worn during the entire therapy session. Hand hygiene was completed before and after therapy session. Patient is not in enhanced droplet precautions.           Visit Dx:     ICD-10-CM ICD-9-CM   1. Left facial numbness  R20.0 782.0   2. Numbness and tingling in left arm  R20.0 782.0    R20.2    3. Hyperglycemia  R73.9 790.29   4. Acute renal impairment  N28.9 593.9     Patient Active Problem List   Diagnosis   • Left facial numbness     Past Medical History:   Diagnosis Date   • CAD (coronary artery disease)    • Diabetes (CMS/HCC)    • Hyperlipidemia    • Hypertension      Past Surgical History:   Procedure Laterality Date   • CHOLECYSTECTOMY     • CORONARY STENT PLACEMENT     • KNEE ARTHROPLASTY     • LEFT HEART CATH          SWALLOW EVALUATION (last 72 hours)      SLP Adult Swallow Evaluation     Row Name 21 1500          Document Type  evaluation  -CP    Subjective Information  no complaints  -CP    Patient Observations  alert;cooperative  -CP    Patient/Family/Caregiver Comments/Observations  Pt was alert and responsive. He was able to follow all commands and answer questions appropriately.   -CP    Patient Effort  good  -CP          Patient Profile Reviewed  yes  -CP    Pertinent History Of Current Problem  Pt was admitted with L sided numbness. Pt is being w/u for CVA. pt failed the swallow screen.   -CP    Current Method of Nutrition  NPO  -CP    Prior Level of Function-Swallowing  no diet consistency restrictions;regular textures;thin liquids  -CP    Plans/Goals Discussed with   patient  -CP    Barriers to Rehab  none identified  -CP          Additional Documentation  Pain Scale: Numbers Pre/Post-Treatment (Group)  -CP          Pretreatment Pain Rating  0/10 - no pain  -CP    Posttreatment Pain Rating  0/10 - no pain  -CP          Dentition Assessment  natural, present and adequate  -CP    Secretion Management  WNL/WFL  -CP    Mucosal Quality  dry  -CP          Oral Motor General Assessment  WFL  -CP          Respiratory Support Currently in Use  room air  -CP    Eating/Swallowing Skills  self-fed;appropriate self-feeding skills observed  -CP    Positioning During Eating  upright 90 degree;upright in bed  -CP    Utensils Used  spoon;straw  -CP    Consistencies Trialed  thin liquids;pureed;soft textures  -CP          Oral Prep Phase  WFL  -CP    Oral Transit  WFL  -CP    Oral Residue  WFL  -CP    Pharyngeal Phase  no overt signs/symptoms of pharyngeal impairment  -CP    Esophageal Phase  unremarkable  -CP    Clinical Swallow Evaluation Summary  Pt seen for clinical swallow eval. Pt given trials of  ice chips, water by spoon and straw, applesauce and a Fig boyer. Pt had functional mastication. Oral transit was timely. There was no pocketing or oral residual. Swallow was timely per palpation. Pt had clear vocal quality after all trials and no cough or other overt s/s of aspiration on any consistency assessed. It is rec that pt initiate a regular diet with thin liquids for now. ST will follow to assure tolerance of diet and make further recs as indicated.   -CP          SLP Swallowing Diagnosis  functional oral phase;functional pharyngeal phase  -CP    Functional Impact  no impact on function  -CP    Rehab Potential/Prognosis, Swallowing  good, to achieve stated therapy goals  -CP    Swallow Criteria for Skilled Therapeutic Interventions Met  demonstrates skilled criteria  -CP          Therapy Frequency (Swallow)  PRN  -CP    Predicted Duration Therapy Intervention (Days)  until discharge  -CP     SLP Diet Recommendation  regular textures;thin liquids  -CP    Recommended Precautions and Strategies  upright posture during/after eating;small bites of food and sips of liquid;alternate between small bites of food and sips of liquid;general aspiration precautions;reflux precautions  -CP    Oral Care Recommendations  Oral Care BID/PRN;Toothbrush  -CP    SLP Rec. for Method of Medication Administration  meds whole;with thin liquids;as tolerated  -CP    Monitor for Signs of Aspiration  yes;notify SLP if any concerns  -CP          Oral Nutrition/Hydration Goal Selection (SLP)  oral nutrition/hydration, SLP goal 1;oral nutrition/hydration, SLP goal 2  -CP          Oral Nutrition/Hydration Goal 1, SLP  Pt will have full meal assessment within 48 hours  -CP    Time Frame (Oral Nutrition/Hydration Goal 1, SLP)  2 days  -CP          Oral Nutrition/Hydration Goal 2, SLP  Pt will tolerate safest and least restrictive diet/liquids without complications of aspiration.    -CP    Time Frame (Oral Nutrition/Hydration Goal 2, SLP)  by discharge  -CP      User Key  (r) = Recorded By, (t) = Taken By, (c) = Cosigned By    Initials Name Effective Dates    Kristine Vazquez, RAQUEL 03/01/19 -           EDUCATION  The patient has been educated in the following areas:   Dysphagia (Swallowing Impairment).    SLP Recommendation and Plan  SLP Swallowing Diagnosis: functional oral phase, functional pharyngeal phase  SLP Diet Recommendation: regular textures, thin liquids  Recommended Precautions and Strategies: upright posture during/after eating, small bites of food and sips of liquid, alternate between small bites of food and sips of liquid, general aspiration precautions, reflux precautions  SLP Rec. for Method of Medication Administration: meds whole, with thin liquids, as tolerated     Monitor for Signs of Aspiration: yes, notify SLP if any concerns     Swallow Criteria for Skilled Therapeutic Interventions Met: demonstrates skilled  criteria     Rehab Potential/Prognosis, Swallowing: good, to achieve stated therapy goals  Therapy Frequency (Swallow): PRN  Predicted Duration Therapy Intervention (Days): until discharge                              SLP GOALS     Row Name 06/06/21 1500             Oral Nutrition/Hydration Goal 1 (SLP)    Oral Nutrition/Hydration Goal 1, SLP  Pt will have full meal assessment within 48 hours  -CP      Time Frame (Oral Nutrition/Hydration Goal 1, SLP)  2 days  -CP         Oral Nutrition/Hydration Goal 2 (SLP)    Oral Nutrition/Hydration Goal 2, SLP  Pt will tolerate safest and least restrictive diet/liquids without complications of aspiration.    -CP      Time Frame (Oral Nutrition/Hydration Goal 2, SLP)  by discharge  -CP        User Key  (r) = Recorded By, (t) = Taken By, (c) = Cosigned By    Initials Name Provider Type    CP Kristine Rangel, SLP Speech and Language Pathologist             Time Calculation:                RAQUEL Morocho  6/6/2021

## 2021-06-06 NOTE — PROGRESS NOTES
LOS: 0 days     Papito Gallo is a 70 y.o. male     Chief Complaint:  Left sided numbness and tingling       SUBJECTIVE:  History taken from: patient chart    Interval History: The patient is a 70 year old gentleman with hypertension, DM II, CAD, HLD who presented to  [facility] of  [facility] secondary to upper back pain as well as numbness and tingling sensation of the left side of face and arm.  A work up for acute stroke was initiated.  The MRI of Brain was unremarkable.  MRI of C-Spine showed multi level spinal stenosis and neural foraminal involvement. He continue to have numbness of the  Left forearm and 4th and 5th fingers of left hand.            Patient Complaints: numbness of left hand.       Review of Systems   Review of Systems   Review of Systems   Constitutional: Negative  HENT: Negative.    Eyes: Negative.    Respiratory: Negative.    Cardiovascular: Negative.    Gastrointestinal: Negative.    Genitourinary: Negative.    Musculoskeletal: pain in the upper back.   Skin: Negative.    Neurological: sensory parethesias.  Hematological: Negative.    Psychiatric/Behavioral: Negative.      Pertinent PMH:  has a past medical history of CAD (coronary artery disease), Diabetes (CMS/HCC), Hyperlipidemia, and Hypertension.   ________________________________________________     OBJECTIVE:  The patient is lying in bed.  Head NC, AT, Neck supple no adenopathy.  Lungs CTA, good pulmonary effort.  CV  S1-S2  No murmur.  Abdomen soft, non tender.  Ext no edema/no cyanosis.       Neurologic Exam    The patient is awake, alert, oriented x 3.  Speech is fluent with good comprehension, follow  Commands, name common objects.  CN VFFC, EOMI, no facial droop. Facial sensation intact on gross finger rub.  Tongue midline. Motor 5/5.  Sensory light touch intact.  Reflees +, plantar mute.  Cerebellum finger to nose intact.     ________________________________________________   RESULTS REVIEW    VITAL SIGNS:  Temp:  [98.1 °F (36.7 °C)-98.7  °F (37.1 °C)] 98.4 °F (36.9 °C)  Heart Rate:  [57-74] 57  Resp:  [15-22] 17  BP: (129-157)/(58-97) 154/73    LABS:   Lab Results   Component Value Date    WBC 6.00 06/05/2021    HGB 12.3 (L) 06/05/2021    HCT 37.8 06/05/2021    MCV 87.5 06/05/2021     06/05/2021     Lab Results   Component Value Date    GLUCOSE 136 (H) 06/06/2021    BUN 27 (H) 06/06/2021    CREATININE 1.84 (H) 06/06/2021    EGFRIFNONA 36 (L) 06/06/2021    BCR 14.7 06/06/2021    K 4.2 06/06/2021    CO2 27.0 06/06/2021    CALCIUM 8.5 (L) 06/06/2021    ALBUMIN 3.90 06/05/2021    LABIL2 1.3 06/27/2019    AST 19 06/05/2021    ALT 27 06/05/2021       Lab Results   Component Value Date    TSH 3.510 06/06/2021    LDL 38 06/06/2021    HGBA1C 9.1 (H) 04/07/2021         IMAGING STUDIES:  XR Spine Cervical 2 or 3 View    Result Date: 6/5/2021  No acute osseous abnormality within the visualized spine. Mild to moderate multilevel degenerative changes are present throughout the spine.  Electronically Signed By-Vangie Thapa MD On:6/5/2021 5:19 PM This report was finalized on [id number] by  Vangie Thapa MD.    XR Spine Thoracic 3 View    Result Date: 6/5/2021  No acute osseous abnormality. Mild to moderate degenerative changes are present.  Electronically Signed By-Vangie Thapa MD On:6/5/2021 5:19 PM This report was finalized on [id number] by  Vangie Thapa MD.    CT Head Without Contrast    Result Date: 6/5/2021  No evidence of hemorrhage, mass effect or midline shift. No acute process identified.  Electronically Signed By-Vangie Thapa MD On:6/5/2021 5:20 PM This report was finalized on [id number] by  Vangie Thapa MD.    MRI Brain Without Contrast    Result Date: 6/5/2021  No evidence of hemorrhage, mass effect or midline shift. No evidence of recent or acute ischemia. Mild periventricular and subcortical FLAIR signal changes are present likely related to chronic microvascular ischemic change.   Electronically Signed By-Vangie Thapa MD On:6/5/2021  8:00 PM This report was finalized on [id number] by  Vangie Thapa MD.    MRI Cervical Spine Without Contrast    Result Date: 6/5/2021  No acute osseous abnormality. Multilevel degenerative changes are present throughout the spine with multifocal canal stenosis present as above. Varying degrees of neural foraminal narrowing as described above most pronounced on the left at C3-C4 and C6-C7.   Electronically Signed By-Vangie Thapa MD On:6/5/2021 8:03 PM This report was finalized on [id number] by  Vangie Thapa MD.      I reviewed the patient's new clinical results.    ________________________________________________      PROBLEM LIST:    Left facial numbness        Assessment/Plan   ASSESSMENT/PLAN:  70 year old old gentleman with HTN, DM who has had numbness of the left side of face and left ulnar distribution. He has  Clinical features of cervical radiculopathy.   He needs to continue  mg po on daily basis.  Rec:  Would consider  Evaluation by spine or neurosurgeon  For cervical radiculopathy.    HTN,  DM as per hospitalist  MD.  The patient is signed off.   Please call for further assistance.      **Please refer to previous notes for further details and recommendations.     I discussed the patients findings and my recommendations with patient and nursing staff    Bushra Lin MD  06/06/21  11:24 EDT

## 2021-06-06 NOTE — PROGRESS NOTES
"      [facility] Medicine Services Daily Progress Note      Hospitalist Team  LOS 0 days      Patient Care Team:  Crow Copeland DO as PCP - General (Family Medicine)    Patient Location: 264/1      Subjective   Subjective     Chief Complaint / Subjective  Chief Complaint   Patient presents with   • Back Pain         Brief Synopsis of Hospital Course/HPI        Date::    6/6/21: Complains of left thoracic back pain close to right scapula tender to palpation.  Also complains of numbness of fourth and fifth digit left hand      Review of Systems   All other systems reviewed and are negative.        Objective   Objective      Vital Signs  Temp:  [98.1 °F (36.7 °C)-98.7 °F (37.1 °C)] 98.4 °F (36.9 °C)  Heart Rate:  [57-74] 57  Resp:  [15-22] 17  BP: (129-157)/(58-97) 154/73  Oxygen Therapy  SpO2: 96 %  Pulse Oximetry Type: Intermittent  Device (Oxygen Therapy): room air  Flowsheet Rows      First Filed Value   Admission Height  172.7 cm (68\") Documented at 06/05/2021 1527   Admission Weight  87.1 kg (192 lb) Documented at 06/05/2021 1527        Intake & Output (last 3 days)       06/03 0701 - 06/04 0700 06/04 0701 - 06/05 0700 06/05 0701 - 06/06 0700 06/06 0701 - 06/07 0700    IV Piggyback   2000     Total Intake(mL/kg)   2000 (23.5)     Net   +2000                 Lines, Drains & Airways    Active LDAs     Name:   Placement date:   Placement time:   Site:   Days:    Peripheral IV 06/05/21 1532 Right Forearm   06/05/21    1532    Forearm   less than 1                  Physical Exam:    Physical Exam  HENT:      Head: Normocephalic.      Nose: Nose normal.   Eyes:      General: No scleral icterus.     Extraocular Movements: Extraocular movements intact.      Pupils: Pupils are equal, round, and reactive to light.   Cardiovascular:      Rate and Rhythm: Normal rate.   Pulmonary:      Effort: Pulmonary effort is normal.   Abdominal:      General: Bowel sounds are normal.   Musculoskeletal:         " General: Normal range of motion.      Cervical back: Normal range of motion.   Skin:     General: Skin is warm.   Neurological:      Mental Status: He is alert. Mental status is at baseline.   Psychiatric:         Mood and Affect: Mood normal.           Procedures:      Results Review:     I reviewed the patient's new clinical results.      Lab Results (last 24 hours)     Procedure Component Value Units Date/Time    COVID-19,CEPHEID,COR/CASSANDRA/PAD/OFELIA IN-HOUSE(OR EMERGENT/ADD-ON),NP SWAB IN TRANSPORT MEDIA 3-4 HR TAT, RT-PCR - Swab, Nasopharynx [[id number]] Collected: 06/06/21 1001    Specimen: Swab from Nasopharynx Updated: 06/06/21 1135    POC Glucose Once [[id number]]  (Abnormal) Collected: 06/06/21 1110    Specimen: Blood Updated: 06/06/21 1111     Glucose 169 mg/dL      Comment: Serial Number: [id number]:  [id number]       POC Glucose Once [[id number]]  (Abnormal) Collected: 06/06/21 1011    Specimen: Blood Updated: 06/06/21 1012     Glucose 111 mg/dL      Comment: Serial Number: [id number]:  [id number]       Hemoglobin A1c [[id number]] Collected: 06/06/21 0740    Specimen: Blood Updated: 06/06/21 0750    POC Glucose Once [[id number]]  (Abnormal) Collected: 06/06/21 0732    Specimen: Blood Updated: 06/06/21 0734     Glucose 108 mg/dL      Comment: Serial Number: [id number]:  [id number]       TSH [[id number]]  (Normal) Collected: 06/06/21 0606    Specimen: Blood Updated: 06/06/21 0650     TSH 3.510 uIU/mL     Basic Metabolic Panel [[id number]]  (Abnormal) Collected: 06/06/21 0606    Specimen: Blood Updated: 06/06/21 0649     Glucose 136 mg/dL      BUN 27 mg/dL      Creatinine 1.84 mg/dL      Sodium 139 mmol/L      Potassium 4.2 mmol/L      Chloride 105 mmol/L      CO2 27.0 mmol/L      Calcium 8.5 mg/dL      eGFR Non African Amer 36 mL/min/1.73      BUN/Creatinine Ratio 14.7     Anion Gap 7.0 mmol/L     Narrative:      GFR Normal >60  Chronic Kidney Disease <60  Kidney Failure <15      Lipid Panel  [[id number]]  (Abnormal) Collected: 06/06/21 0606    Specimen: Blood Updated: 06/06/21 0648     Total Cholesterol 93 mg/dL      Triglycerides 117 mg/dL      HDL Cholesterol 34 mg/dL      LDL Cholesterol  38 mg/dL      VLDL Cholesterol 21 mg/dL      LDL/HDL Ratio 1.05    Narrative:      Cholesterol Reference Ranges  (U.S. Department of Health and Human Services ATP III Classifications)    Desirable          <200 mg/dL  Borderline High    200-239 mg/dL  High Risk          >240 mg/dL      Triglyceride Reference Ranges  (U.S. Department of Health and Human Services ATP III Classifications)    Normal           <150 mg/dL  Borderline High  150-199 mg/dL  High             200-499 mg/dL  Very High        >500 mg/dL    HDL Reference Ranges  (U.S. Department of Health and Human Services ATP III Classifcations)    Low     <40 mg/dl (major risk factor for CHD)  High    >60 mg/dl ('negative' risk factor for CHD)        LDL Reference Ranges  (U.S. Department of Health and Human Services ATP III Classifcations)    Optimal          <100 mg/dL  Near Optimal     100-129 mg/dL  Borderline High  130-159 mg/dL  High             160-189 mg/dL  Very High        >189 mg/dL    Vitamin B12 [[id number]] Collected: 06/06/21 0606    Specimen: Blood Updated: 06/06/21 0618    POC Glucose Once [[id number]]  (Abnormal) Collected: 06/05/21 2041    Specimen: Blood Updated: 06/05/21 2045     Glucose 261 mg/dL      Comment: Serial Number: [id number]:  [id number]       POC Glucose Once [[id number]]  (Abnormal) Collected: 06/05/21 1849    Specimen: Blood Updated: 06/05/21 1850     Glucose 368 mg/dL      Comment: Serial Number: [id number]:  [id number]       POC Glucose Once [[id number]]  (Abnormal) Collected: 06/05/21 1822    Specimen: Blood Updated: 06/05/21 1823     Glucose 405 mg/dL      Comment: Serial Number: [id number]:  [id number]       Blood Gas, Arterial - [[id number]]  (Abnormal) Collected: 06/05/21 1750    Specimen: Arterial Blood  Updated: 06/05/21 1757     Site Right Radial     Sergio's Test Positive     pH, Arterial 7.330 pH units      pCO2, Arterial 45.1 mm Hg      pO2, Arterial 34.8 mm Hg      HCO3, Arterial 23.8 mmol/L      Base Excess, Arterial -2.2 mmol/L      Comment: Serial Number: [id number]:  [id number]        O2 Saturation, Arterial 62.4 %      CO2 Content 25.2 mmol/L      Barometric Pressure for Blood Gas --     Comment: N/A        Modality Room Air     FIO2 21 %      Hemodilution No    Narrative:      Results look venous    Urinalysis With Culture If Indicated - Urine, Clean Catch [[id number]]  (Abnormal) Collected: 06/05/21 1746    Specimen: Urine, Clean Catch Updated: 06/05/21 1754     Color, UA Yellow     Appearance, UA Clear     pH, UA 7.0     Specific Gravity, UA 1.024     Glucose, UA >=1000 mg/dL (3+)     Ketones, UA Negative     Bilirubin, UA Negative     Blood, UA Negative     Protein, UA Negative     Leuk Esterase, UA Negative     Nitrite, UA Negative     Urobilinogen, UA 0.2 E.U./dL    Narrative:      Urine microscopic not indicated.    Troponin [[id number]]  (Normal) Collected: 06/05/21 1532    Specimen: Blood Updated: 06/05/21 1735     Troponin T 0.018 ng/mL     Narrative:      Troponin T Reference Range:  <= 0.03 ng/mL-   Negative for AMI  >0.03 ng/mL-     Abnormal for myocardial necrosis.  Clinicians would have to utilize clinical acumen, EKG, Troponin and serial changes to determine if it is an Acute Myocardial Infarction or myocardial injury due to an underlying chronic condition.       Results may be falsely decreased if patient taking Biotin.      Waterflow Draw [[id number]] Collected: 06/05/21 1532    Specimen: Blood Updated: 06/05/21 1645    Narrative:      The following orders were created for panel order Waterflow Draw.  Procedure                               Abnormality         Status                     ---------                               -----------         ------                     Light Blue  Top[[id number]]                                   Final result               Green Top (Gel)[[id number]]                                  Final result               Lavender Top[[id number]]                                     Final result               Gold Top - SST[[id number]]                                   Final result                 Please view results for these tests on the individual orders.    Light Blue Top [[id number]] Collected: 06/05/21 1532    Specimen: Blood Updated: 06/05/21 1645     Extra Tube hold for add-on     Comment: Auto resulted       Green Top (Gel) [[id number]] Collected: 06/05/21 1532    Specimen: Blood Updated: 06/05/21 1645     Extra Tube Hold for add-ons.     Comment: Auto resulted.       Gold Top - SST [[id number]] Collected: 06/05/21 1532    Specimen: Blood Updated: 06/05/21 1645     Extra Tube Hold for add-ons.     Comment: Auto resulted.       Comprehensive Metabolic Panel [[id number]]  (Abnormal) Collected: 06/05/21 1532    Specimen: Blood Updated: 06/05/21 1616     Glucose 519 mg/dL      BUN 36 mg/dL      Creatinine 2.16 mg/dL      Sodium 130 mmol/L      Potassium 4.9 mmol/L      Chloride 95 mmol/L      CO2 22.0 mmol/L      Calcium 8.4 mg/dL      Total Protein 6.6 g/dL      Albumin 3.90 g/dL      ALT (SGPT) 27 U/L      AST (SGOT) 19 U/L      Alkaline Phosphatase 118 U/L      Total Bilirubin 0.4 mg/dL      eGFR Non African Amer 30 mL/min/1.73      Globulin 2.7 gm/dL      A/G Ratio 1.4 g/dL      BUN/Creatinine Ratio 16.7     Anion Gap 13.0 mmol/L     Narrative:      GFR Normal >60  Chronic Kidney Disease <60  Kidney Failure <15      Lavender Top [[id number]] Collected: 06/05/21 1532    Specimen: Blood Updated: 06/05/21 1611    CBC & Differential [[id number]]  (Abnormal) Collected: 06/05/21 1532    Specimen: Blood Updated: 06/05/21 1610    Narrative:      The following orders were created for panel order CBC & Differential.  Procedure                               Abnormality         Status                      ---------                               -----------         ------                     CBC Auto Differential[[id number]]        Abnormal            Final result                 Please view results for these tests on the individual orders.    CBC Auto Differential [[id number]]  (Abnormal) Collected: 06/05/21 1532    Specimen: Blood Updated: 06/05/21 1610     WBC 6.00 10*3/mm3      RBC 4.32 10*6/mm3      Hemoglobin 12.3 g/dL      Hematocrit 37.8 %      MCV 87.5 fL      MCH 28.3 pg      MCHC 32.4 g/dL      RDW 13.6 %      RDW-SD 41.1 fl      MPV 9.5 fL      Platelets 145 10*3/mm3      Neutrophil % 76.5 %      Lymphocyte % 13.6 %      Monocyte % 7.7 %      Eosinophil % 1.8 %      Basophil % 0.4 %      Neutrophils, Absolute 4.60 10*3/mm3      Lymphocytes, Absolute 0.80 10*3/mm3      Monocytes, Absolute 0.50 10*3/mm3      Eosinophils, Absolute 0.10 10*3/mm3      Basophils, Absolute 0.00 10*3/mm3      nRBC 0.0 /100 WBC         No results found for: HGBA1C        Results from last 7 days   Lab Units 06/05/21  1750   PH, ARTERIAL pH units 7.330*   PO2 ART mm Hg 34.8*   PCO2, ARTERIAL mm Hg 45.1   HCO3 ART mmol/L 23.8     No results found for: LIPASE  Lab Results   Component Value Date    CHOL 93 06/06/2021    TRIG 117 06/06/2021    HDL 34 (L) 06/06/2021    LDL 38 06/06/2021       No results found for: INTRAOP, PREDX, FINALDX, COMDX    Microbiology Results (last 10 days)     ** No results found for the last 240 hours. **          ECG/EMG Results (most recent)     Procedure Component Value Units Date/Time    ECG 12 Lead [[id number]] Collected: 06/05/21 1716     Updated: 06/06/21 0839     QT Interval 452 ms     Narrative:      HEART RATE= 70  bpm  RR Interval= 856  ms  CT Interval= 236  ms  P Horizontal Axis= -62  deg  P Front Axis= 45  deg  QRSD Interval= 158  ms  QT Interval= 452  ms  QRS Axis= -67  deg  T Wave Axis= 51  deg  - ABNORMAL ECG -  Sinus rhythm  Atrial premature complex  Prolonged CT interval  RBBB  and LAFB  Electronically Signed By: Kameron Zambrano ([facility]) 06-Jun-2021 08:39:32  Date and Time of Study: 2021-06-05 17:16:15                  XR Spine Cervical 2 or 3 View    Result Date: 6/5/2021  No acute osseous abnormality within the visualized spine. Mild to moderate multilevel degenerative changes are present throughout the spine.  Electronically Signed By-Vangie Thapa MD On:6/5/2021 5:19 PM This report was finalized on [id number] by  Vangie Thapa MD.    XR Spine Thoracic 3 View    Result Date: 6/5/2021  No acute osseous abnormality. Mild to moderate degenerative changes are present.  Electronically Signed By-Vangie Thapa MD On:6/5/2021 5:19 PM This report was finalized on [id number] by  Vangie Thapa MD.    CT Head Without Contrast    Result Date: 6/5/2021  No evidence of hemorrhage, mass effect or midline shift. No acute process identified.  Electronically Signed By-Vangie Thapa MD On:6/5/2021 5:20 PM This report was finalized on [id number] by  Vangie Thapa MD.    MRI Brain Without Contrast    Result Date: 6/5/2021  No evidence of hemorrhage, mass effect or midline shift. No evidence of recent or acute ischemia. Mild periventricular and subcortical FLAIR signal changes are present likely related to chronic microvascular ischemic change.   Electronically Signed By-Vangie Thapa MD On:6/5/2021 8:00 PM This report was finalized on [id number] by  Vangie Thapa MD.    MRI Cervical Spine Without Contrast    Result Date: 6/5/2021  No acute osseous abnormality. Multilevel degenerative changes are present throughout the spine with multifocal canal stenosis present as above. Varying degrees of neural foraminal narrowing as described above most pronounced on the left at C3-C4 and C6-C7.   Electronically Signed By-Vangie Thapa MD On:6/5/2021 8:03 PM This report was finalized on [id number] by  Vangie Thapa MD.          Xrays, labs reviewed personally by physician.    Medication Review:   I have reviewed  the patient's current medication list      Scheduled Meds  amLODIPine, 10 mg, Oral, Daily  aspirin, 81 mg, Oral, Daily  atorvastatin, 40 mg, Oral, Nightly  Canagliflozin, 100 mg, Oral, Daily  citalopram, 10 mg, Oral, Daily  cloNIDine, 0.2 mg, Oral, Q8H  clopidogrel, 75 mg, Oral, Daily  enoxaparin, 40 mg, Subcutaneous, Daily  gabapentin, 100 mg, Oral, Q8H  insulin glargine, 45 Units, Subcutaneous, Nightly  insulin lispro, 0-14 Units, Subcutaneous, TID AC  insulin lispro, 5 Units, Subcutaneous, TID With Meals  lidocaine, 1 patch, Transdermal, Q24H  pantoprazole, 40 mg, Oral, QAM AC  sodium chloride, 10 mL, Intravenous, Q12H        Meds Infusions  sodium chloride, 75 mL/hr, Last Rate: 75 mL/hr (06/05/21 1732)        Meds PRN  •  acetaminophen  •  dextrose  •  dextrose  •  glucagon (human recombinant)  •  HYDROcodone-acetaminophen  •  insulin lispro **AND** insulin lispro  •  LORazepam  •  ondansetron  •  sodium chloride  •  [COMPLETED] Insert peripheral IV **AND** sodium chloride  •  sodium chloride        Assessment/Plan   Assessment/Plan     Active Hospital Problems    Diagnosis  POA   • Left facial numbness [R20.0]  Yes      Resolved Hospital Problems   No resolved problems to display.       MEDICAL DECISION MAKING COMPLEXITY BY PROBLEM:     1.  Upper back pain with left-sided radiculopathy.  -Evaluated by neurosurgery  -s/p CT head, CTA head and neck, MRI brain/cervical spine  -We will give trial of muscle relaxers and lidocaine patch     2.  Uncontrolled IDDM.  -Continue ISS and Lantus     3.  Hypertension  -Continue amlodipine, clonidine     4.  Hyperlipidemia  -Continue statin.     5.  CAD-s/p 3 coronary stents  -on aspirin and Plavix.    VTE Prophylaxis -   Mechanical Order History:     None      Pharmalogical Order History:      Ordered     Dose Route Frequency Stop    06/05/21 2205  enoxaparin (LOVENOX) syringe 40 mg      40 mg SC Daily --                  Code Status -   Code Status and Medical  Interventions:   Ordered at: 06/05/21 2209     Code Status:    CPR     Medical Interventions (Level of Support Prior to Arrest):    Full       This patient has been examined wearing appropriate Personal Protective Equipment and discussed with nursing. 06/06/21        Discharge Planning  defer        Electronically signed by Niko May DO, 06/06/21, 12:03 EDT.  [facility] Hospitalist Team

## 2021-06-06 NOTE — THERAPY EVALUATION
Patient Name: Papito Gallo  : 1950    MRN: [id number]                              Today's Date: 2021       Admit Date: 2021    Visit Dx:     ICD-10-CM ICD-9-CM   1. Left facial numbness  R20.0 782.0   2. Numbness and tingling in left arm  R20.0 782.0    R20.2    3. Hyperglycemia  R73.9 790.29   4. Acute renal impairment  N28.9 593.9     Patient Active Problem List   Diagnosis   • Left facial numbness     Past Medical History:   Diagnosis Date   • CAD (coronary artery disease)    • Diabetes (CMS/HCC)    • Hyperlipidemia    • Hypertension      Past Surgical History:   Procedure Laterality Date   • CHOLECYSTECTOMY     • CORONARY STENT PLACEMENT     • KNEE ARTHROPLASTY     • LEFT HEART CATH       General Information     [facility] Name 21 1628          Physical Therapy Time and Intention    Document Type  evaluation  -     Mode of Treatment  physical therapy  -[facility] Name 21 1628          General Information    Patient Profile Reviewed  yes  -     Prior Level of Function  independent:;driving  -     Existing Precautions/Restrictions  no known precautions/restrictions  -     Barriers to Rehab  none identified  -[facility] Name 21 1628          Living Environment    Lives With  spouse  -[facility] Name 21 1628          Cognition    Orientation Status (Cognition)  oriented x 4  -[facility] Name 21 1628          Safety Issues, Functional Mobility    Impairments Affecting Function (Mobility)  pain;range of motion (ROM)  -       User Key  (r) = Recorded By, (t) = Taken By, (c) = Cosigned By    Initials Name Provider Type     Alana Pate PT Physical Therapist        Mobility     Row Name 21 1706          Bed Mobility    Bed Mobility  bed mobility (all) activities  -     All Activities, [place] (Bed Mobility)  modified independence  -     Assistive Device (Bed Mobility)  head of bed elevated  -[facility] Name 21 170          Sit-Stand Transfer     Sit-Stand [place] (Transfers)  standby assist  -JH     Row Name 06/06/21 1706          Gait/Stairs (Locomotion)    [place] Level (Gait)  standby assist  -     Distance in Feet (Gait)  200'  -     Comment (Gait/Stairs)  steady gait pattern, has to stop several times due to sharp pains in L scapular region  -       User Key  (r) = Recorded By, (t) = Taken By, (c) = Cosigned By    Initials Name Provider Type     Alana Pate, JASON Physical Therapist        Obj/Interventions     [facility] Name 06/06/21 1707          Range of Motion Comprehensive    Comment, General Range of Motion  limited cervical ROM due to L scapular border pain/muscle tightness.  Full AROM BUE/LE  -[facility] Name 06/06/21 1707          Strength Comprehensive (MMT)    Comment, General Manual Muscle Testing (MMT) Assessment  5/5 UE/LE  -JH     Row Name 06/06/21 1707          Balance    Balance Assessment  sitting static balance;sitting dynamic balance;standing static balance;standing dynamic balance  -     Static Sitting Balance  WNL  -     Dynamic Sitting Balance  WNL  -     Static Standing Balance  WNL  -     Dynamic Standing Balance  WNL  -JH     Row Name 06/06/21 1707          Sensory Assessment (Somatosensory)    Sensory Assessment (Somatosensory)  -- reports numbness/tingling L side of lips and LUE posterior arm and digits 3-5  -       User Key  (r) = Recorded By, (t) = Taken By, (c) = Cosigned By    Initials Name Provider Type     Alana Pate, JASON Physical Therapist        Goals/Plan    No documentation.       Clinical Impression     Row Name 06/06/21 1708          Pain    Additional Documentation  Pain Scale: FACES Pre/Post-Treatment (Group)  -JH     Row Name 06/06/21 1708          Pain Scale: Numbers Pre/Post-Treatment    Pretreatment Pain Rating  3/10  -     Posttreatment Pain Rating  8/10  -JH     Row Name 06/06/21 1708          Pain Scale: FACES Pre/Post-Treatment    Pain Location - Side  Left  -     Pain  Location - Orientation  upper  -     Pain Location  back lateral L scapular border, very tender to palpation, noted muscle tightness compared to R side  -JH     Row Name 06/06/21 1708          Plan of Care Review    Plan of Care Reviewed With  patient  [facility]     Outcome Summary  69 yo male admitted with numbness LUE and L side lips.  Pt with recent chiropractor visits for pain in upper back/mid scapula region.  Pt demos ind with all mobility, has guarded cervical ROM due to scapular pain.  Pt is tender to palpation with noted muscle tightness L scapular border.  Recommend OP PT if scapular pain does not subside.  No further therapy needs while IP.  -[facility] Name 06/06/21 1708          Therapy Assessment/Plan (PT)    Criteria for Skilled Interventions Met (PT)  no;no problems identified which require skilled intervention  -JH     Row Name 06/06/21 1708          Vital Signs    O2 Delivery Pre Treatment  room [facility]     O2 Delivery Intra Treatment  room air  -     O2 Delivery Post Treatment  room air  [facility] 06/06/21 1708          Positioning and Restraints    Pre-Treatment Position  in bed  [facility]     Post Treatment Position  bed  -JH     In Bed  notified nsg;call light within reach towel roll along L scapular border per pt requesting for positioning in bed  [facility]       User Key  (r) = Recorded By, (t) = Taken By, (c) = Cosigned By    Initials Name Provider Type    Alana Jimenez PT Physical Therapist        Outcome Measures     Row Name 06/06/21 1712          Modified [place] Scale    Modified [place] Scale  2 - Slight disability.  Unable to carry out all previous activities but able to look after own affairs without assistance.  -JH     Row Name 06/06/21 1712          Functional Assessment    Outcome Measure Options  Modified [place]  -       User Key  (r) = Recorded By, (t) = Taken By, (c) = Cosigned By    Initials Name Provider Type    Alana Jimenez PT Physical Therapist        Physical Therapy  Education                 Title: PT OT SLP Therapies (Done)     Topic: Physical Therapy (Done)     Point: Mobility training (Done)     Learning Progress Summary           Patient Acceptance, E,TB, VU by  at 6/6/2021 1712                               User Key     Initials Effective Dates Name Provider Type Discipline     03/01/19 -  Alana Pate PT Physical Therapist PT              PT Recommendation and Plan     Plan of Care Reviewed With: patient  Outcome Summary: 71 yo male admitted with numbness LUE and L side lips.  Pt with recent chiropractor visits for pain in upper back/mid scapula region.  Pt demos ind with all mobility, has guarded cervical ROM due to scapular pain.  Pt is tender to palpation with noted muscle tightness L scapular border.  Recommend OP PT if scapular pain does not subside.  No further therapy needs while IP.     Time Calculation:   PT Charges     Row Name 06/06/21 1713             Time Calculation    Start Time  0925  -      Stop Time  0938  -      Time Calculation (min)  13 min  -      PT Received On  06/06/21  -         Time Calculation- PT    Total Timed Code Minutes- PT  0 minute(s)  -        User Key  (r) = Recorded By, (t) = Taken By, (c) = Cosigned By    Initials Name Provider Type     Alana Pate PT Physical Therapist        Therapy Charges for Today     Code Description Service Date Service Provider Modifiers Qty    53654199932 HC PT EVAL LOW COMPLEXITY 2 6/6/2021 Alana Pate PT GP 1          PT G-Codes  Outcome Measure Options: Modified [place]  Modified [place] Scale: 2 - Slight disability.  Unable to carry out all previous activities but able to look after own affairs without assistance.    Alana Pate PT  6/6/2021

## 2021-06-06 NOTE — H&P
[facility] Medicine Services      Patient Name: Papito Gallo  : 1950  MRN: [id number]  Primary Care Physician: Crow Copeland DO  Date of admission: 2021    Patient Care Team:  Crow Copeland DO as PCP - General (Family Medicine)          Subjective   History Present Illness     Chief Complaint:   Chief Complaint   Patient presents with   • Back Pain     History of Present Illness  70-year-old  male with history of uncontrolled IDDM, CAD-3 coronary stents on DAPT, HTN, and HLP, presented to the emergency department today with complaints of upper back pain between the shoulder blades that has been present for the last week and has been getting worse.  Patient reports he has been going to the chiropractor and saw him on Tuesday and Thursday for this pain that is between his shoulder blades.  It has not helped.  Today he felt numbness along the back of his left arm going down to the ulnar side 3 fingers.  He states he also felt numbness on the left side of the lip.  He reports mild headache.  No motor weakness, no headache, nausea vomiting or any other complaint. Patient has a history of a lumbar fusion in  but no prior history of pain in his neck or thoracic spine.  States that the symptoms started sometime after 11 AM.  He was at a memorial service for his grandson.    Emergency department course:  Afebrile, hemodynamically stable, no acute distress.  ED NP mentioned in her physical examination that there is left facial droop and weakness of the left lower extremity.  Labs were significant for initial blood glucose level of 519, creatinine 2.16 [up from 1.42 years ago], BUN 36 and GFR 30.  EKG showed sinus rhythm, right BBB and LAFB.  Head CT scan without contrast did not show any acute intracranial process.  Blood glucose came down to 261 following H&H of regular insulin IV.  Patient was also given 300 mg of aspirin suppository, 1 mg of Dilaudid, IV Zofran and  "2 L boluses of normal saline.  He was seen by neurologist Dr. Mejias who ordered MRI of the brain and cervical spine.  Brain MRI reported \"No evidence of hemorrhage, mass effect or midline shift. No evidence of recent or acute ischemia. Mild periventricular and subcortical FLAIR signal changes are present likely related to chronic microvascular ischemic change \".  MRI of the CS reported \"No acute osseous abnormality. Multilevel degenerative changes are present throughout the spine with multifocal canal stenosis present as above. Varying degrees of neural foraminal narrowing as described above most pronounced on the left at C3-C4 and C6-C7 \".    ROS  Please refer to HPI. All other systems were reviewed and were negative.     Personal History     Past Medical History:   Past Medical History:   Diagnosis Date   • CAD (coronary artery disease)    • Diabetes (CMS/HCC)    • Hyperlipidemia    • Hypertension        Surgical History:      Past Surgical History:   Procedure Laterality Date   • CHOLECYSTECTOMY     • CORONARY STENT PLACEMENT     • KNEE ARTHROPLASTY     • LEFT HEART CATH             Family History: family history includes Heart disease in his brother, father, and mother. Otherwise pertinent FHx was reviewed and unremarkable.     Social History:  reports that he quit smoking about 40 years ago. He has never used smokeless tobacco. He reports that he does not drink alcohol and does not use drugs.      Medications:  Prior to Admission medications    Medication Sig Start Date End Date Taking? Authorizing Provider   amLODIPine (NORVASC) 10 MG tablet Take 10 mg by mouth Daily.   Yes Michael Richards MD   aspirin 81 MG chewable tablet Chew 81 mg Daily.   Yes Michael Richards MD   atorvastatin (LIPITOR) 40 MG tablet Take 40 mg by mouth Every Night.   Yes Michael Richards MD   citalopram (CeleXA) 20 MG tablet Take 10 mg by mouth Daily.   Yes Michael Richards MD   cloNIDine (CATAPRES) 0.1 MG tablet " Take 0.1 mg by mouth 2 (Two) Times a Day.   Yes Michael Richards MD   clopidogrel (PLAVIX) 75 MG tablet Take 75 mg by mouth Daily.   Yes Michael Richards MD   dapagliflozin (FARXIGA) 5 MG tablet tablet Take 5 mg by mouth Every Night.   Yes Michael Richards MD   furosemide (LASIX) 20 MG tablet Take 20 mg by mouth Daily.   Yes Michael Richards MD   insulin degludec (TRESIBA FLEXTOUCH) 100 UNIT/ML solution pen-injector injection Inject 40 Units under the skin into the appropriate area as directed Every Night.   Yes Michael Richards MD   pantoprazole (PROTONIX) 40 MG EC tablet Take 40 mg by mouth Daily.   Yes Michael Richards MD       Allergies:    Allergies   Allergen Reactions   • Bactrim [Sulfamethoxazole-Trimethoprim] Rash       Objective   Objective     Vital Signs  Temp:  [98.3 °F (36.8 °C)-98.7 °F (37.1 °C)] 98.3 °F (36.8 °C)  Heart Rate:  [57-74] 57  Resp:  [15-22] 15  BP: (129-157)/(58-97) 157/76  SpO2:  [93 %-98 %] 98 %  on   ;      Body mass index is 28.5 kg/m².    Physical Exam  General: WD, WN, alert and oriented x3, in pain when he moves his back.   Eyes:  Show anicteric sclerae, moist conjunctivae with no lig lag; PERRLA.  HENT:  Normocephalic, atraumatic, moist oral mucosa.  Neck: Supple, no bruit, no JVP, no thyroid or lymph node enlargement, trachea central, tenderness between shoulder blades.  Lungs:  Good air entry. Clear to auscultation.   Heart: RRR, no murmur or rub.   Abdomen:  Soft, not tender, not distended, no organomegaly, bowel sounds positive.   Extremities: No leg edema or joint swelling, no calf tenderness, normal range of movement, pedal pulses intact.   Skin: No rash, lesions, or ulcers.  Normal texture and turgor.  Neurology:  Grossly intact.   Psychiatric exam: Pleasant, cooperative, appropriate mood and affect, intact judgment and insight.    Results Review:  I have personally reviewed most recent cardiac tracings, lab results and radiology images and  interpretations and agree with findings, most notably: As below.    Results from last 7 days   Lab Units 06/05/21  1532   WBC 10*3/mm3 6.00   HEMOGLOBIN g/dL 12.3*   HEMATOCRIT % 37.8   PLATELETS 10*3/mm3 145     Results from last 7 days   Lab Units 06/05/21  1532   SODIUM mmol/L 130*   POTASSIUM mmol/L 4.9   CHLORIDE mmol/L 95*   CO2 mmol/L 22.0   BUN mg/dL 36*   CREATININE mg/dL 2.16*   GLUCOSE mg/dL 519*   CALCIUM mg/dL 8.4*   ALT (SGPT) U/L 27   AST (SGOT) U/L 19   TROPONIN T ng/mL 0.018     Estimated Creatinine Clearance: 32.8 mL/min (A) (by C-G formula based on SCr of 2.16 mg/dL (H)).  Brief Urine Lab Results  (Last result in the past 365 days)      Color   Clarity   Blood   Leuk Est   Nitrite   Protein   CREAT   Urine HCG        06/05/21 1746 Yellow Clear Negative Negative Negative Negative               Microbiology Results (last 10 days)     ** No results found for the last 240 hours. **          ECG/EMG Results (most recent)     Procedure Component Value Units Date/Time    ECG 12 Lead [[id number]] Collected: 06/05/21 1716     Updated: 06/05/21 1717     QT Interval 452 ms     Narrative:      HEART RATE= 70  bpm  RR Interval= 856  ms  OH Interval= 236  ms  P Horizontal Axis= -62  deg  P Front Axis= 45  deg  QRSD Interval= 158  ms  QT Interval= 452  ms  QRS Axis= -67  deg  T Wave Axis= 51  deg  - ABNORMAL ECG -  Sinus rhythm  Atrial premature complex  Prolonged OH interval  RBBB and LAFB  Electronically Signed By:   Date and Time of Study: 2021-06-05 17:16:15                  XR Spine Cervical 2 or 3 View    Result Date: 6/5/2021  No acute osseous abnormality within the visualized spine. Mild to moderate multilevel degenerative changes are present throughout the spine.  Electronically Signed By-Vangie Thapa MD On:6/5/2021 5:19 PM This report was finalized on [id number] by  Vangie Thapa MD.    XR Spine Thoracic 3 View    Result Date: 6/5/2021  No acute osseous abnormality. Mild to moderate degenerative  changes are present.  Electronically Signed By-Vangie Thapa MD On:6/5/2021 5:19 PM This report was finalized on [id number] by  Vangie Thapa MD.    CT Head Without Contrast    Result Date: 6/5/2021  No evidence of hemorrhage, mass effect or midline shift. No acute process identified.  Electronically Signed By-Vangie Thapa MD On:6/5/2021 5:20 PM This report was finalized on [id number] by  Vangie Thapa MD.    MRI Brain Without Contrast    Result Date: 6/5/2021  No evidence of hemorrhage, mass effect or midline shift. No evidence of recent or acute ischemia. Mild periventricular and subcortical FLAIR signal changes are present likely related to chronic microvascular ischemic change.   Electronically Signed By-Vangie Thapa MD On:6/5/2021 8:00 PM This report was finalized on [id number] by  Vangie Thapa MD.    MRI Cervical Spine Without Contrast    Result Date: 6/5/2021  No acute osseous abnormality. Multilevel degenerative changes are present throughout the spine with multifocal canal stenosis present as above. Varying degrees of neural foraminal narrowing as described above most pronounced on the left at C3-C4 and C6-C7.   Electronically Signed By-Vangie Thapa MD On:6/5/2021 8:03 PM This report was finalized on [id number] by  Vangie Thapa MD.        Estimated Creatinine Clearance: 32.8 mL/min (A) (by C-G formula based on SCr of 2.16 mg/dL (H)).    Assessment/Plan   Assessment/Plan       Active Hospital Problems    Diagnosis  POA   • Left facial numbness [R20.0]  Yes      Resolved Hospital Problems   No resolved problems to display.     Assessment:  1.  Upper back pain with cervical left-sided radiculopathy.  -MRI of cervical spine showing multilevel degenerative changes are  present throughout the spine with multifocal canal stenosis and neural foraminal narrowing most pronounced on the left at C3-C4 and C6-C7.  -Brain MRI did not show any acute intracranial process.    2.  Uncontrolled IDDM.    3.   Hypertension-partially controlled.    4.  Hyperlipidemia-on statin.    5.  CAD-s/p 3 coronary stents-on aspirin and Plavix.    Plan:  -Admission to neuro heart unit.  -Neurologist Dr. Mejias is following up.  -PT/OT/SLP evaluation.  -Provide pain relief with simple analgesics, opiates, and add gabapentin.  -Maintain glycemic control with basal, prandial, and correctional insulins.  -Increase the dose of clonidine and continue amlodipine 10 mg for BP control.  -Continue patient's other home medications.  -DVT prophylaxis with subcutaneous Lovenox.  -Check lipid panel, vitamin B12 and TSH levels.          VTE Prophylaxis -   Mechanical Order History:     None      Pharmalogical Order History:      Ordered     Dose Route Frequency Stop    06/05/21 2209  enoxaparin (LOVENOX) syringe 40 mg      40 mg SC Every 24 Hours --                CODE STATUS:    Code Status and Medical Interventions:   Ordered at: 06/05/21 2209     Code Status:    CPR     Medical Interventions (Level of Support Prior to Arrest):    Full       This patient has been examined wearing appropriate Personal Protective Equipment and discussed with hospital infection control department. 06/05/21      I discussed the patient's findings and my recommendations with patient.      Signature:Electronically signed by Angie Aragon MD, 06/05/21, 10:25 PM EDT.      [facility] Hospitalist Team

## 2021-06-06 NOTE — PLAN OF CARE
Goal Outcome Evaluation:  Plan of Care Reviewed With: patient     Outcome Summary: 71 yo male admitted with numbness LUE and L side lips.  Pt with recent chiropractor visits for pain in upper back/mid scapula region.  Pt demos ind with all mobility, has guarded cervical ROM due to scapular pain.  Pt is tender to palpation with noted muscle tightness L scapular border.  Recommend OP PT if scapular pain does not subside.  No further therapy needs while IP.

## 2021-06-06 NOTE — PLAN OF CARE
Problem: Adult Inpatient Plan of Care  Goal: Plan of Care Review  Outcome: Ongoing, Progressing  Goal: Patient-Specific Goal (Individualized)  Outcome: Ongoing, Progressing  Goal: Absence of Hospital-Acquired Illness or Injury  Outcome: Ongoing, Progressing  Intervention: Identify and Manage Fall Risk  Recent Flowsheet Documentation  Taken 6/6/2021 1600 by Odilia Milton RN  Safety Promotion/Fall Prevention: safety round/check completed  Taken 6/6/2021 1400 by Odilia Milton RN  Safety Promotion/Fall Prevention: safety round/check completed  Taken 6/6/2021 1200 by Odilia Milton RN  Safety Promotion/Fall Prevention: safety round/check completed  Taken 6/6/2021 1000 by Odilia Milton RN  Safety Promotion/Fall Prevention: safety round/check completed  Taken 6/6/2021 0800 by Odilia Milton RN  Safety Promotion/Fall Prevention: safety round/check completed  Intervention: Prevent and Manage VTE (venous thromboembolism) Risk  Recent Flowsheet Documentation  Taken 6/6/2021 0800 by Odilia Milton RN  VTE Prevention/Management:   sequential compression devices off   patient refused intervention  Goal: Optimal Comfort and Wellbeing  Outcome: Ongoing, Progressing  Intervention: Provide Person-Centered Care  Recent Flowsheet Documentation  Taken 6/6/2021 1600 by Odilia Milton RN  Trust Relationship/Rapport:   care explained   choices provided   emotional support provided   empathic listening provided  Taken 6/6/2021 1200 by Odilia Milton RN  Trust Relationship/Rapport:   care explained   choices provided   empathic listening provided  Taken 6/6/2021 0800 by Odilia Milton RN  Trust Relationship/Rapport:   care explained   choices provided   emotional support provided   empathic listening provided  Goal: Readiness for Transition of Care  Outcome: Ongoing, Progressing     Problem: Adjustment to Illness (Stroke, Ischemic/Transient Ischemic Attack)  Goal:  Optimal Coping  Outcome: Ongoing, Progressing     Problem: Bowel Elimination Impaired (Stroke, Ischemic/Transient Ischemic Attack)  Goal: Effective Bowel Elimination  Outcome: Ongoing, Progressing     Problem: Cerebral Tissue Perfusion Risk (Stroke, Ischemic/Transient Ischemic Attack)  Goal: Optimal Cerebral Tissue Perfusion  Outcome: Ongoing, Progressing     Problem: Communication Impairment (Stroke, Ischemic/Transient Ischemic Attack)  Goal: Improved Communication Skills  Outcome: Ongoing, Progressing     Problem: Eating/Swallowing Impairment (Stroke, Ischemic/Transient Ischemic Attack)  Goal: Oral Intake without Aspiration  Outcome: Ongoing, Progressing     Problem: Functional Ability Impaired (Stroke, Ischemic/Transient Ischemic Attack)  Goal: Optimal Functional Ability  Outcome: Ongoing, Progressing     Problem: Hemodynamic Instability (Stroke, Ischemic/Transient Ischemic Attack)  Goal: Vital Signs Remain in Desired Range  Outcome: Ongoing, Progressing     Problem: Pain (Stroke, Ischemic/Transient Ischemic Attack)  Goal: Acceptable Pain Control  Outcome: Ongoing, Progressing     Problem: Sensorimotor Impairment (Stroke, Ischemic/Transient Ischemic Attack)  Goal: Improved Sensorimotor Function  Outcome: Ongoing, Progressing     Problem: Urinary Elimination Impaired (Stroke, Ischemic/Transient Ischemic Attack)  Goal: Effective Urinary Elimination  Outcome: Ongoing, Progressing   Goal Outcome Evaluation:

## 2021-06-06 NOTE — PLAN OF CARE
Problem: Adult Inpatient Plan of Care  Goal: Plan of Care Review  Outcome: Ongoing, Progressing  Flowsheets (Taken 6/6/2021 0719)  Progress: no change  Plan of Care Reviewed With: patient  Goal: Patient-Specific Goal (Individualized)  Outcome: Ongoing, Progressing  Goal: Absence of Hospital-Acquired Illness or Injury  Outcome: Ongoing, Progressing  Intervention: Identify and Manage Fall Risk  Recent Flowsheet Documentation  Taken 6/6/2021 0600 by Eliezer Wilson RN  Safety Promotion/Fall Prevention:   safety round/check completed   room organization consistent  Taken 6/6/2021 0400 by Eliezer Wilson RN  Safety Promotion/Fall Prevention:   safety round/check completed   room organization consistent  Taken 6/6/2021 0200 by Eliezer Wilson RN  Safety Promotion/Fall Prevention:   safety round/check completed   room organization consistent  Taken 6/6/2021 0000 by Eliezer Wilson RN  Safety Promotion/Fall Prevention:   safety round/check completed   room organization consistent  Taken 6/5/2021 2040 by Eliezer Wilson RN  Safety Promotion/Fall Prevention:   safety round/check completed   room organization consistent  Intervention: Prevent Infection  Recent Flowsheet Documentation  Taken 6/6/2021 0600 by Eliezer Wilson RN  Infection Prevention:   personal protective equipment utilized   visitors restricted/screened  Taken 6/6/2021 0400 by Eliezer Wilson RN  Infection Prevention:   personal protective equipment utilized   visitors restricted/screened  Taken 6/6/2021 0000 by Eliezer Wilson RN  Infection Prevention:   personal protective equipment utilized   visitors restricted/screened  Taken 6/5/2021 2040 by Eliezer Wilson RN  Infection Prevention:   personal protective equipment utilized   visitors restricted/screened  Goal: Optimal Comfort and Wellbeing  Outcome: Ongoing, Progressing  Intervention: Provide Person-Centered Care  Recent Flowsheet Documentation  Taken 6/6/2021 0400 by  Eliezer Wilson RN  Trust Relationship/Rapport: care explained  Taken 6/6/2021 0000 by Eliezer Wilson RN  Trust Relationship/Rapport: care explained  Taken 6/5/2021 2040 by Eliezer Wilson RN  Trust Relationship/Rapport:   care explained   choices provided   thoughts/feelings acknowledged  Goal: Readiness for Transition of Care  Outcome: Ongoing, Progressing  Intervention: Mutually Develop Transition Plan  Recent Flowsheet Documentation  Taken 6/5/2021 2036 by Eliezer Wilson RN  Transportation Anticipated: family or friend will provide  Patient/Family Anticipated Services at Transition:   Patient/Family Anticipates Transition to: home with family  Taken 6/5/2021 2035 by Eliezer Wilson RN  Equipment Currently Used at Home: none     Problem: Adjustment to Illness (Stroke, Ischemic/Transient Ischemic Attack)  Goal: Optimal Coping  Outcome: Ongoing, Progressing  Intervention: Support Patient/Family Psychosocial Response to Stroke  Recent Flowsheet Documentation  Taken 6/6/2021 0400 by Eliezer Wilson RN  Supportive Measures: active listening utilized  Taken 6/5/2021 2040 by Eliezer Wilson RN  Supportive Measures: active listening utilized  Family/Support System Care: support provided     Problem: Bowel Elimination Impaired (Stroke, Ischemic/Transient Ischemic Attack)  Goal: Effective Bowel Elimination  Outcome: Ongoing, Progressing     Problem: Cerebral Tissue Perfusion Risk (Stroke, Ischemic/Transient Ischemic Attack)  Goal: Optimal Cerebral Tissue Perfusion  Outcome: Ongoing, Progressing  Intervention: Protect and Optimize Cerebral Perfusion  Recent Flowsheet Documentation  Taken 6/6/2021 0000 by Eliezer Wilson RN  Cerebral Perfusion Promotion: blood pressure monitored  Taken 6/5/2021 2040 by Eliezer Wilson RN  Cerebral Perfusion Promotion: blood pressure monitored     Problem: Communication Impairment (Stroke, Ischemic/Transient Ischemic Attack)  Goal: Improved  Communication Skills  Outcome: Ongoing, Progressing  Intervention: Optimize Cognitive and Communication Skills  Recent Flowsheet Documentation  Taken 6/6/2021 0000 by Eliezer Wilson RN  Communication Enhancement Strategies: call light answered in person  Taken 6/5/2021 2040 by Eliezer Wilson RN  Communication Enhancement Strategies: call light answered in person     Problem: Eating/Swallowing Impairment (Stroke, Ischemic/Transient Ischemic Attack)  Goal: Oral Intake without Aspiration  Outcome: Ongoing, Progressing     Problem: Functional Ability Impaired (Stroke, Ischemic/Transient Ischemic Attack)  Goal: Optimal Functional Ability  Outcome: Ongoing, Progressing  Intervention: Optimize Functional Ability  Recent Flowsheet Documentation  Taken 6/5/2021 2040 by Eliezer Wilson RN  Activity Management: up ad berta     Problem: Hemodynamic Instability (Stroke, Ischemic/Transient Ischemic Attack)  Goal: Vital Signs Remain in Desired Range  Outcome: Ongoing, Progressing     Problem: Pain (Stroke, Ischemic/Transient Ischemic Attack)  Goal: Acceptable Pain Control  Outcome: Ongoing, Progressing     Problem: Sensorimotor Impairment (Stroke, Ischemic/Transient Ischemic Attack)  Goal: Improved Sensorimotor Function  Outcome: Ongoing, Progressing  Intervention: Optimize Sensory and Perceptual Abilities  Recent Flowsheet Documentation  Taken 6/6/2021 0000 by Eliezer Wilson RN  Pressure Reduction Devices: pressure-redistributing mattress utilized  Taken 6/5/2021 2040 by Eliezer Wilson RN  Pressure Reduction Devices: pressure-redistributing mattress utilized     Problem: Urinary Elimination Impaired (Stroke, Ischemic/Transient Ischemic Attack)  Goal: Effective Urinary Elimination  Outcome: Ongoing, Progressing   Goal Outcome Evaluation:  Plan of Care Reviewed With: patient  Progress: no change

## 2021-06-06 NOTE — PLAN OF CARE
Goal Outcome Evaluation:    Pt seen for clinical swallow eval. Pt given trials of  ice chips, water by spoon and straw, applesauce and a Fig boyer. Pt had functional mastication. Oral transit was timely. There was no pocketing or oral residual. Swallow was timely per palpation. Pt had clear vocal quality after all trials and no cough or other overt s/s of aspiration on any consistency assessed. It is rec that pt initiate a regular diet with thin liquids for now. ST will follow to assure tolerance of diet and make further recs as indicated.

## 2021-06-07 ENCOUNTER — APPOINTMENT (OUTPATIENT)
Dept: MRI IMAGING | Facility: HOSPITAL | Age: 71
End: 2021-06-07

## 2021-06-07 LAB
ANION GAP SERPL CALCULATED.3IONS-SCNC: 6 MMOL/L (ref 5–15)
BASOPHILS # BLD AUTO: 0 10*3/MM3 (ref 0–0.2)
BASOPHILS NFR BLD AUTO: 0.4 % (ref 0–1.5)
BUN SERPL-MCNC: 27 MG/DL (ref 8–23)
BUN/CREAT SERPL: 13.7 (ref 7–25)
CALCIUM SPEC-SCNC: 8.7 MG/DL (ref 8.6–10.5)
CHLORIDE SERPL-SCNC: 104 MMOL/L (ref 98–107)
CK SERPL-CCNC: 45 U/L (ref 20–200)
CO2 SERPL-SCNC: 27 MMOL/L (ref 22–29)
CREAT SERPL-MCNC: 1.97 MG/DL (ref 0.76–1.27)
DEPRECATED RDW RBC AUTO: 40.7 FL (ref 37–54)
EOSINOPHIL # BLD AUTO: 0.1 10*3/MM3 (ref 0–0.4)
EOSINOPHIL NFR BLD AUTO: 2.4 % (ref 0.3–6.2)
ERYTHROCYTE [DISTWIDTH] IN BLOOD BY AUTOMATED COUNT: 13.5 % (ref 12.3–15.4)
ERYTHROCYTE [SEDIMENTATION RATE] IN BLOOD: 11 MM/HR (ref 0–20)
GFR SERPL CREATININE-BSD FRML MDRD: 34 ML/MIN/1.73
GLUCOSE BLDC GLUCOMTR-MCNC: 118 MG/DL (ref 70–105)
GLUCOSE BLDC GLUCOMTR-MCNC: 147 MG/DL (ref 70–105)
GLUCOSE BLDC GLUCOMTR-MCNC: 188 MG/DL (ref 70–105)
GLUCOSE BLDC GLUCOMTR-MCNC: 188 MG/DL (ref 70–105)
GLUCOSE SERPL-MCNC: 145 MG/DL (ref 65–99)
HCT VFR BLD AUTO: 34 % (ref 37.5–51)
HGB BLD-MCNC: 11.4 G/DL (ref 13–17.7)
LYMPHOCYTES # BLD AUTO: 1.2 10*3/MM3 (ref 0.7–3.1)
LYMPHOCYTES NFR BLD AUTO: 24.5 % (ref 19.6–45.3)
MAGNESIUM SERPL-MCNC: 2 MG/DL (ref 1.6–2.4)
MCH RBC QN AUTO: 28.4 PG (ref 26.6–33)
MCHC RBC AUTO-ENTMCNC: 33.5 G/DL (ref 31.5–35.7)
MCV RBC AUTO: 85 FL (ref 79–97)
MONOCYTES # BLD AUTO: 0.4 10*3/MM3 (ref 0.1–0.9)
MONOCYTES NFR BLD AUTO: 9 % (ref 5–12)
NEUTROPHILS NFR BLD AUTO: 3.1 10*3/MM3 (ref 1.7–7)
NEUTROPHILS NFR BLD AUTO: 63.7 % (ref 42.7–76)
NRBC BLD AUTO-RTO: 0 /100 WBC (ref 0–0.2)
PLATELET # BLD AUTO: 138 10*3/MM3 (ref 140–450)
PMV BLD AUTO: 9.1 FL (ref 6–12)
POTASSIUM SERPL-SCNC: 4.4 MMOL/L (ref 3.5–5.2)
RBC # BLD AUTO: 4 10*6/MM3 (ref 4.14–5.8)
SODIUM SERPL-SCNC: 137 MMOL/L (ref 136–145)
WBC # BLD AUTO: 4.9 10*3/MM3 (ref 3.4–10.8)

## 2021-06-07 PROCEDURE — 63710000001 INSULIN GLARGINE PER 5 UNITS: Performed by: INTERNAL MEDICINE

## 2021-06-07 PROCEDURE — 70551 MRI BRAIN STEM W/O DYE: CPT

## 2021-06-07 PROCEDURE — 80048 BASIC METABOLIC PNL TOTAL CA: CPT | Performed by: HOSPITALIST

## 2021-06-07 PROCEDURE — 85652 RBC SED RATE AUTOMATED: CPT | Performed by: HOSPITALIST

## 2021-06-07 PROCEDURE — 25010000002 LORAZEPAM PER 2 MG: Performed by: HOSPITALIST

## 2021-06-07 PROCEDURE — 99233 SBSQ HOSP IP/OBS HIGH 50: CPT | Performed by: NURSE PRACTITIONER

## 2021-06-07 PROCEDURE — 97535 SELF CARE MNGMENT TRAINING: CPT

## 2021-06-07 PROCEDURE — 82550 ASSAY OF CK (CPK): CPT | Performed by: HOSPITALIST

## 2021-06-07 PROCEDURE — G0378 HOSPITAL OBSERVATION PER HR: HCPCS

## 2021-06-07 PROCEDURE — 99224 PR SBSQ OBSERVATION CARE/DAY 15 MINUTES: CPT | Performed by: HOSPITALIST

## 2021-06-07 PROCEDURE — 82962 GLUCOSE BLOOD TEST: CPT

## 2021-06-07 PROCEDURE — 83735 ASSAY OF MAGNESIUM: CPT | Performed by: HOSPITALIST

## 2021-06-07 PROCEDURE — 63710000001 INSULIN LISPRO (HUMAN) PER 5 UNITS: Performed by: INTERNAL MEDICINE

## 2021-06-07 PROCEDURE — 25010000003 HYDROCORTISONE SOD SUCCINATE PF 250 MG RECONSTITUTED SOLUTION: Performed by: NEUROLOGICAL SURGERY

## 2021-06-07 PROCEDURE — 85025 COMPLETE CBC W/AUTO DIFF WBC: CPT | Performed by: HOSPITALIST

## 2021-06-07 PROCEDURE — 99204 OFFICE O/P NEW MOD 45 MIN: CPT | Performed by: NEUROLOGICAL SURGERY

## 2021-06-07 PROCEDURE — 97165 OT EVAL LOW COMPLEX 30 MIN: CPT

## 2021-06-07 PROCEDURE — 25010000002 ENOXAPARIN PER 10 MG: Performed by: INTERNAL MEDICINE

## 2021-06-07 RX ORDER — GABAPENTIN 300 MG/1
300 CAPSULE ORAL 2 TIMES DAILY
Status: DISCONTINUED | OUTPATIENT
Start: 2021-06-07 | End: 2021-06-09 | Stop reason: HOSPADM

## 2021-06-07 RX ADMIN — ASPIRIN 81 MG: 81 TABLET, CHEWABLE ORAL at 08:54

## 2021-06-07 RX ADMIN — INSULIN GLARGINE 45 UNITS: 100 INJECTION, SOLUTION SUBCUTANEOUS at 20:16

## 2021-06-07 RX ADMIN — LIDOCAINE 1 PATCH: 50 PATCH TOPICAL at 08:53

## 2021-06-07 RX ADMIN — INSULIN LISPRO 5 UNITS: 100 INJECTION, SOLUTION INTRAVENOUS; SUBCUTANEOUS at 12:49

## 2021-06-07 RX ADMIN — PANTOPRAZOLE SODIUM 40 MG: 40 TABLET, DELAYED RELEASE ORAL at 08:54

## 2021-06-07 RX ADMIN — CYCLOBENZAPRINE HYDROCHLORIDE 10 MG: 10 TABLET, FILM COATED ORAL at 08:54

## 2021-06-07 RX ADMIN — INSULIN LISPRO 3 UNITS: 100 INJECTION, SOLUTION INTRAVENOUS; SUBCUTANEOUS at 12:48

## 2021-06-07 RX ADMIN — CLOPIDOGREL BISULFATE 75 MG: 75 TABLET ORAL at 08:54

## 2021-06-07 RX ADMIN — GABAPENTIN 300 MG: 300 CAPSULE ORAL at 20:15

## 2021-06-07 RX ADMIN — GABAPENTIN 100 MG: 100 CAPSULE ORAL at 15:02

## 2021-06-07 RX ADMIN — INSULIN LISPRO 5 UNITS: 100 INJECTION, SOLUTION INTRAVENOUS; SUBCUTANEOUS at 08:55

## 2021-06-07 RX ADMIN — ATORVASTATIN CALCIUM 40 MG: 40 TABLET, FILM COATED ORAL at 20:15

## 2021-06-07 RX ADMIN — GABAPENTIN 100 MG: 100 CAPSULE ORAL at 05:34

## 2021-06-07 RX ADMIN — AMLODIPINE BESYLATE 10 MG: 5 TABLET ORAL at 08:53

## 2021-06-07 RX ADMIN — Medication 10 ML: at 20:16

## 2021-06-07 RX ADMIN — ENOXAPARIN SODIUM 40 MG: 40 INJECTION SUBCUTANEOUS at 18:05

## 2021-06-07 RX ADMIN — CITALOPRAM HYDROBROMIDE 10 MG: 20 TABLET ORAL at 08:54

## 2021-06-07 RX ADMIN — HYDROCODONE BITARTRATE AND ACETAMINOPHEN 1 TABLET: 7.5; 325 TABLET ORAL at 18:48

## 2021-06-07 RX ADMIN — CLONIDINE HYDROCHLORIDE 0.2 MG: 0.1 TABLET ORAL at 05:34

## 2021-06-07 RX ADMIN — CLONIDINE HYDROCHLORIDE 0.2 MG: 0.1 TABLET ORAL at 15:02

## 2021-06-07 RX ADMIN — HYDROCODONE BITARTRATE AND ACETAMINOPHEN 1 TABLET: 7.5; 325 TABLET ORAL at 00:17

## 2021-06-07 RX ADMIN — LORAZEPAM 1 MG: 2 INJECTION INTRAMUSCULAR; INTRAVENOUS at 22:39

## 2021-06-07 RX ADMIN — HYDROCODONE BITARTRATE AND ACETAMINOPHEN 1 TABLET: 7.5; 325 TABLET ORAL at 05:34

## 2021-06-07 RX ADMIN — HYDROCORTISONE SODIUM SUCCINATE 250 MG: 250 INJECTION, POWDER, FOR SOLUTION INTRAMUSCULAR; INTRAVENOUS at 20:15

## 2021-06-07 RX ADMIN — HYDROCODONE BITARTRATE AND ACETAMINOPHEN 1 TABLET: 7.5; 325 TABLET ORAL at 22:39

## 2021-06-07 RX ADMIN — CYCLOBENZAPRINE HYDROCHLORIDE 10 MG: 10 TABLET, FILM COATED ORAL at 18:05

## 2021-06-07 RX ADMIN — Medication 10 ML: at 08:54

## 2021-06-07 RX ADMIN — INSULIN LISPRO 5 UNITS: 100 INJECTION, SOLUTION INTRAVENOUS; SUBCUTANEOUS at 18:05

## 2021-06-07 RX ADMIN — CLONIDINE HYDROCHLORIDE 0.2 MG: 0.1 TABLET ORAL at 22:39

## 2021-06-07 RX ADMIN — CANAGLIFLOZIN 100 MG: 300 TABLET, FILM COATED ORAL at 08:53

## 2021-06-07 RX ADMIN — HYDROCODONE BITARTRATE AND ACETAMINOPHEN 1 TABLET: 7.5; 325 TABLET ORAL at 12:49

## 2021-06-07 RX ADMIN — CYCLOBENZAPRINE HYDROCHLORIDE 10 MG: 10 TABLET, FILM COATED ORAL at 20:15

## 2021-06-07 NOTE — PLAN OF CARE
Problem: Adult Inpatient Plan of Care  Goal: Plan of Care Review  Outcome: Ongoing, Progressing  Flowsheets (Taken 6/7/2021 0516)  Progress: improving  Plan of Care Reviewed With: patient  Goal: Patient-Specific Goal (Individualized)  Outcome: Ongoing, Progressing  Goal: Absence of Hospital-Acquired Illness or Injury  Outcome: Ongoing, Progressing  Intervention: Identify and Manage Fall Risk  Recent Flowsheet Documentation  Taken 6/7/2021 0500 by Eliezer Wilson RN  Safety Promotion/Fall Prevention: safety round/check completed  Taken 6/7/2021 0400 by Eliezer Wilson RN  Safety Promotion/Fall Prevention:   safety round/check completed   room organization consistent  Taken 6/7/2021 0300 by Eliezer Wilson RN  Safety Promotion/Fall Prevention: safety round/check completed  Taken 6/7/2021 0200 by Eliezer Wilson RN  Safety Promotion/Fall Prevention: safety round/check completed  Taken 6/7/2021 0100 by Eliezer Wilson RN  Safety Promotion/Fall Prevention:   safety round/check completed   room organization consistent  Taken 6/6/2021 2345 by Eliezer Wilson RN  Safety Promotion/Fall Prevention:   safety round/check completed   room organization consistent   clutter free environment maintained   assistive device/personal items within reach  Taken 6/6/2021 2200 by Eliezer Wilson RN  Safety Promotion/Fall Prevention:   safety round/check completed   room organization consistent  Taken 6/6/2021 2107 by Eliezer Wilson RN  Safety Promotion/Fall Prevention:   safety round/check completed   room organization consistent  Taken 6/6/2021 2000 by Eliezer Wilson RN  Safety Promotion/Fall Prevention:   safety round/check completed   room organization consistent  Intervention: Prevent Infection  Recent Flowsheet Documentation  Taken 6/7/2021 0400 by Eliezer Wilson RN  Infection Prevention:   personal protective equipment utilized   visitors restricted/screened  Taken 6/7/2021 0100 by Steve  EVELINA Martins  Infection Prevention:   personal protective equipment utilized   visitors restricted/screened  Taken 6/6/2021 2345 by Eliezer Wilson RN  Infection Prevention:   personal protective equipment utilized   visitors restricted/screened  Taken 6/6/2021 2000 by Eliezer Wilson RN  Infection Prevention:   personal protective equipment utilized   visitors restricted/screened  Goal: Optimal Comfort and Wellbeing  Outcome: Ongoing, Progressing  Intervention: Provide Person-Centered Care  Recent Flowsheet Documentation  Taken 6/7/2021 0400 by Eliezer Wilson RN  Trust Relationship/Rapport: care explained  Taken 6/6/2021 2345 by Eliezer Wilson RN  Trust Relationship/Rapport: care explained  Taken 6/6/2021 2000 by Eliezer Wilson RN  Trust Relationship/Rapport: care explained  Goal: Readiness for Transition of Care  Outcome: Ongoing, Progressing     Problem: Adjustment to Illness (Stroke, Ischemic/Transient Ischemic Attack)  Goal: Optimal Coping  Outcome: Ongoing, Progressing  Intervention: Support Patient/Family Psychosocial Response to Stroke  Recent Flowsheet Documentation  Taken 6/6/2021 2000 by Eliezer Wilson RN  Supportive Measures: active listening utilized     Problem: Bowel Elimination Impaired (Stroke, Ischemic/Transient Ischemic Attack)  Goal: Effective Bowel Elimination  Outcome: Ongoing, Progressing     Problem: Cerebral Tissue Perfusion Risk (Stroke, Ischemic/Transient Ischemic Attack)  Goal: Optimal Cerebral Tissue Perfusion  Outcome: Ongoing, Progressing     Problem: Communication Impairment (Stroke, Ischemic/Transient Ischemic Attack)  Goal: Improved Communication Skills  Outcome: Ongoing, Progressing  Intervention: Optimize Cognitive and Communication Skills  Recent Flowsheet Documentation  Taken 6/7/2021 0400 by Eliezer Wilson RN  Communication Enhancement Strategies: call light answered in person  Taken 6/6/2021 2345 by Eliezer Wilson RN  Communication Enhancement  Strategies: call light answered in person  Taken 6/6/2021 2000 by Eliezer Wilson RN  Communication Enhancement Strategies: call light answered in person     Problem: Eating/Swallowing Impairment (Stroke, Ischemic/Transient Ischemic Attack)  Goal: Oral Intake without Aspiration  Outcome: Ongoing, Progressing     Problem: Functional Ability Impaired (Stroke, Ischemic/Transient Ischemic Attack)  Goal: Optimal Functional Ability  Outcome: Ongoing, Progressing  Intervention: Optimize Functional Ability  Recent Flowsheet Documentation  Taken 6/6/2021 2000 by Eliezer Wilson RN  Activity Management: up ad berta     Problem: Hemodynamic Instability (Stroke, Ischemic/Transient Ischemic Attack)  Goal: Vital Signs Remain in Desired Range  Outcome: Ongoing, Progressing     Problem: Pain (Stroke, Ischemic/Transient Ischemic Attack)  Goal: Acceptable Pain Control  Outcome: Ongoing, Progressing  Intervention: Monitor and Manage Pain  Recent Flowsheet Documentation  Taken 6/6/2021 2345 by Eliezer Wilson RN  Pain Management Interventions: other (see comments)  Taken 6/6/2021 2000 by Eliezer Wilson RN  Pain Management Interventions: see MAR     Problem: Sensorimotor Impairment (Stroke, Ischemic/Transient Ischemic Attack)  Goal: Improved Sensorimotor Function  Outcome: Ongoing, Progressing  Intervention: Optimize Sensory and Perceptual Abilities  Recent Flowsheet Documentation  Taken 6/6/2021 2000 by Eliezer Wilson, RN  Pressure Reduction Devices: pressure-redistributing mattress utilized     Problem: Urinary Elimination Impaired (Stroke, Ischemic/Transient Ischemic Attack)  Goal: Effective Urinary Elimination  Outcome: Ongoing, Progressing   Goal Outcome Evaluation:  Plan of Care Reviewed With: patient  Progress: improving

## 2021-06-07 NOTE — PLAN OF CARE
Problem: Adult Inpatient Plan of Care  Goal: Plan of Care Review  Outcome: Ongoing, Progressing  Goal: Patient-Specific Goal (Individualized)  Outcome: Ongoing, Progressing  Goal: Absence of Hospital-Acquired Illness or Injury  Outcome: Ongoing, Progressing  Intervention: Identify and Manage Fall Risk  Recent Flowsheet Documentation  Taken 6/7/2021 1600 by Odilia Milton RN  Safety Promotion/Fall Prevention: safety round/check completed  Taken 6/7/2021 1400 by Odilia Milton RN  Safety Promotion/Fall Prevention: safety round/check completed  Taken 6/7/2021 1200 by Odilia Milton RN  Safety Promotion/Fall Prevention: safety round/check completed  Taken 6/7/2021 1000 by Odilia Milton RN  Safety Promotion/Fall Prevention: safety round/check completed  Taken 6/7/2021 0800 by Odilia Milton RN  Safety Promotion/Fall Prevention: safety round/check completed  Intervention: Prevent and Manage VTE (venous thromboembolism) Risk  Recent Flowsheet Documentation  Taken 6/7/2021 0800 by Odilia Milton RN  VTE Prevention/Management: sequential compression devices off  Intervention: Prevent Infection  Recent Flowsheet Documentation  Taken 6/7/2021 1400 by Odilia Milton RN  Infection Prevention: hand hygiene promoted  Goal: Optimal Comfort and Wellbeing  Outcome: Ongoing, Progressing  Intervention: Provide Person-Centered Care  Recent Flowsheet Documentation  Taken 6/7/2021 1600 by Odilia Milton RN  Trust Relationship/Rapport:   emotional support provided   choices provided   care explained  Taken 6/7/2021 0800 by Odilia Milton RN  Trust Relationship/Rapport:   emotional support provided   choices provided   care explained  Goal: Readiness for Transition of Care  Outcome: Ongoing, Progressing     Problem: Adjustment to Illness (Stroke, Ischemic/Transient Ischemic Attack)  Goal: Optimal Coping  Outcome: Ongoing, Progressing     Problem: Bowel Elimination  Impaired (Stroke, Ischemic/Transient Ischemic Attack)  Goal: Effective Bowel Elimination  Outcome: Ongoing, Progressing     Problem: Cerebral Tissue Perfusion Risk (Stroke, Ischemic/Transient Ischemic Attack)  Goal: Optimal Cerebral Tissue Perfusion  Outcome: Ongoing, Progressing     Problem: Communication Impairment (Stroke, Ischemic/Transient Ischemic Attack)  Goal: Improved Communication Skills  Outcome: Ongoing, Progressing  Intervention: Optimize Cognitive and Communication Skills  Recent Flowsheet Documentation  Taken 6/7/2021 0800 by Odilia Milton RN  Communication Enhancement Strategies: call light answered in person     Problem: Eating/Swallowing Impairment (Stroke, Ischemic/Transient Ischemic Attack)  Goal: Oral Intake without Aspiration  Outcome: Ongoing, Progressing     Problem: Functional Ability Impaired (Stroke, Ischemic/Transient Ischemic Attack)  Goal: Optimal Functional Ability  Outcome: Ongoing, Progressing     Problem: Hemodynamic Instability (Stroke, Ischemic/Transient Ischemic Attack)  Goal: Vital Signs Remain in Desired Range  Outcome: Ongoing, Progressing     Problem: Pain (Stroke, Ischemic/Transient Ischemic Attack)  Goal: Acceptable Pain Control  Outcome: Ongoing, Progressing     Problem: Sensorimotor Impairment (Stroke, Ischemic/Transient Ischemic Attack)  Goal: Improved Sensorimotor Function  Outcome: Ongoing, Progressing     Problem: Urinary Elimination Impaired (Stroke, Ischemic/Transient Ischemic Attack)  Goal: Effective Urinary Elimination  Outcome: Ongoing, Progressing   Goal Outcome Evaluation:

## 2021-06-07 NOTE — CONSULTS
Patient Care Team:  Crow Copeland DO as PCP - General (Family Medicine)    Chief complaint left facial numbness and left arm numbness    Subjective .     History of present illness: This patient began having some numbness around his face about 48 hours ago.  He has a history of numbness in his left arm for a week or 2.  He had no motor weakness.  He was concerned he was having a stroke and so he came to the emergency room on .  He was admitted for further evaluation.  Currently feels about the same as he did at the time of admission.  He is a diabetic and he has had no specific treatment so far.  He also has a history of heart disease and hypertension.    Review of Systems  Pertinent items are noted in HPI.  History    Past Medical History:   Diagnosis Date   • CAD (coronary artery disease)    • Diabetes (CMS/HCC)    • Hyperlipidemia    • Hypertension    ,   Past Surgical History:   Procedure Laterality Date   • CHOLECYSTECTOMY     • CORONARY STENT PLACEMENT     • KNEE ARTHROPLASTY     • LEFT HEART CATH     ,   Family History   Problem Relation Age of Onset   • Heart disease Mother    • Heart disease Father    • Heart disease Brother    ,   Social History     Socioeconomic History   • Marital status:      Spouse name: Not on file   • Number of children: Not on file   • Years of education: Not on file   • Highest education level: Not on file   Tobacco Use   • Smoking status: Former Smoker     Quit date: 1981     Years since quittin.0   • Smokeless tobacco: Never Used   Substance and Sexual Activity   • Alcohol use: Never   • Drug use: Never   • Sexual activity: Defer     E-cigarette/Vaping     E-cigarette/Vaping Substances     E-cigarette/Vaping Devices       ,   Medications Prior to Admission   Medication Sig Dispense Refill Last Dose   • amLODIPine (NORVASC) 10 MG tablet Take 10 mg by mouth Daily.   2021 at Unknown time   • aspirin 81 MG chewable tablet Chew 81 mg Daily.   2021  at Unknown time   • atorvastatin (LIPITOR) 40 MG tablet Take 40 mg by mouth Every Night.   6/4/2021 at Unknown time   • citalopram (CeleXA) 20 MG tablet Take 10 mg by mouth Daily.   6/5/2021 at Unknown time   • cloNIDine (CATAPRES) 0.1 MG tablet Take 0.1 mg by mouth 2 (Two) Times a Day.   6/5/2021 at Unknown time   • clopidogrel (PLAVIX) 75 MG tablet Take 75 mg by mouth Daily.   6/5/2021 at Unknown time   • dapagliflozin (FARXIGA) 5 MG tablet tablet Take 5 mg by mouth Every Night.   6/4/2021 at Unknown time   • furosemide (LASIX) 20 MG tablet Take 20 mg by mouth Daily.   6/5/2021 at Unknown time   • insulin degludec (TRESIBA FLEXTOUCH) 100 UNIT/ML solution pen-injector injection Inject 40 Units under the skin into the appropriate area as directed Every Night.   6/4/2021 at Unknown time   • pantoprazole (PROTONIX) 40 MG EC tablet Take 40 mg by mouth Daily.   6/5/2021 at Unknown time    and Allergies:  Bactrim [sulfamethoxazole-trimethoprim]    Objective     Vital Signs   Temp:  [97.9 °F (36.6 °C)-98.8 °F (37.1 °C)] 98.4 °F (36.9 °C)  Heart Rate:  [50-63] 62  Resp:  [12-15] 12  BP: (107-142)/(52-68) 117/52    Physical Exam:   Physical Exam  Constitutional:       Appearance: He is well-developed.   HENT:      Head: Normocephalic and atraumatic.   Eyes:      Extraocular Movements: EOM normal.      Conjunctiva/sclera: Conjunctivae normal.      Pupils: Pupils are equal, round, and reactive to light.   Neck:      Vascular: No carotid bruit.   Neurological:      Mental Status: He is oriented to person, place, and time.      Coordination: Finger-Nose-Finger Test and Heel to Shin Test normal.      Gait: Gait is intact.      Deep Tendon Reflexes:      Reflex Scores:       Tricep reflexes are 2+ on the right side and 2+ on the left side.       Bicep reflexes are 2+ on the right side and 2+ on the left side.       Brachioradialis reflexes are 2+ on the right side and 2+ on the left side.       Patellar reflexes are 2+ on the  right side and 2+ on the left side.       Achilles reflexes are 2+ on the right side and 2+ on the left side.  Psychiatric:         Speech: Speech normal.          Neurologic Exam     Mental Status   Oriented to person, place, and time.   Registration of memory: Good recent and remote memory.   Attention: normal. Concentration: normal.   Speech: speech is normal   Level of consciousness: alert  Knowledge: consistent with education.     Cranial Nerves     CN II   Visual fields full to confrontation.   Visual acuity: normal    CN III, IV, VI   Pupils are equal, round, and reactive to light.  Extraocular motions are normal.     CN V   Facial sensation intact.   Right corneal reflex: normal  Left corneal reflex: normal    CN VII   Facial expression full, symmetric.   Right facial weakness: none  Left facial weakness: none    CN VIII   Hearing: intact    CN IX, X   Palate: symmetric    CN XI   Right sternocleidomastoid strength: normal  Left sternocleidomastoid strength: normal    CN XII   Tongue: not atrophic  Tongue deviation: none    Motor Exam   Muscle bulk: normal  Right arm tone: normal  Left arm tone: normal  Right leg tone: normal  Left leg tone: normal    Strength   Strength 5/5 except as noted.     Sensory Exam   Light touch normal.     Gait, Coordination, and Reflexes     Gait  Gait: normal    Coordination   Finger to nose coordination: normal  Heel to shin coordination: normal    Reflexes   Right brachioradialis: 2+  Left brachioradialis: 2+  Right biceps: 2+  Left biceps: 2+  Right triceps: 2+  Left triceps: 2+  Right patellar: 2+  Left patellar: 2+  Right achilles: 2+  Left achilles: 2+  Right : 2+  Left : 2+      Results Review:   I reviewed the patient's new clinical results.  I reviewed his MRI which shows stenosis at C3-4 C5-6 and C6-7.  It is not severe but it is definitely narrow.      Assessment/Plan       Left facial numbness      I told the patient that the stenosis is almost certainly the  cause of his symptoms.  This in combination with his diabetes is likely the reason why he has so much numbness.  I recommended we go ahead and try him on some IV steroids just to see if that helps his symptoms.  If it does then we can transfer him to p.o.  And even consider some epidural blocks before considering surgery.  He is in agreement with that plan.    I discussed the patients findings and my recommendations with patient and nursing staff    Crow Echavarria MD  06/07/21  17:32 EDT

## 2021-06-07 NOTE — PLAN OF CARE
Goal Outcome Evaluation:  Plan of Care Reviewed With: patient           Outcome Summary: Pt is a 71 y/o M admit after recent chiropractor visit for pain in upper back/mid scapula region. Pt now having severe numbness in his L face/arm. MRI noted multi level stenosis and neural foramina involvement. PMH includes HTN, DM, IDDM and CAD. Pt resides with his spouse in a house with few GABINO. Pt reports indep with ADLs and mobility PTA. Pt requires CGA for mobility this date 2/2 reported vision changes (pt unable to specifically identify the vision deficit). Pt appears below ADL baseline, limited by pain, impaired sensation, impaired vision, impaired endurance, impaired balance and acuity of illness. OT recommends continued treatment 5x/week and d/c home with family assist as well as OP OT.

## 2021-06-07 NOTE — PROGRESS NOTES
LOS: 0 days     Chief Complaint:  Left sided numbness and tingling       SUBJECTIVE:  History taken from: patient chart RN    Interval History: The patient is a 70 year old gentleman with hypertension, DM II, CAD, HLD who presented to  [facility] of  [facility] secondary to upper back pain as well as numbness and tingling sensation of the left side of face and arm.  A work up for acute stroke was initiated.  The MRI of Brain was unremarkable.  MRI of C-Spine showed multi level spinal stenosis and neural foraminal involvement. He continue to have numbness of the  Left forearm and 4th and 5th fingers of left hand.    - Portions of the above HPI were copied from previous encounters and edited as appropriate.    Pt states he developed spontaneous left upper back/scapula pain about 1-2 weeks ago. He does not recall any injury, over use, or strain to that area. He also noted left arm  (C8-T1 distribution) with numbness of the left middle, ring, and little finger tips. The symptoms started at the same time. He took OTC ibuprofen at night without improvement so he went to a chiropractor. He did not notice any improvement or immediate worsening of symptoms. He has had worsening shoulder/upper back pain over the past few days which prevents him from sleeping at times. He has severe point tenderness over the trapezius. Lidocaine patch has not helped much.     Yesterday, pt developed numbness of the left half of his lips and jaw. Today, he states the facial numbness has progressed to include nearly the whole left half of his face. He c/o vague vision changes, but is unable to describe the changes. He denies any visual field deficit or blurred vision. He denies any other areas of numbness other than the left arm that is unchanged. No facial asymmetry, focal weakness, dizziness, speech changes, n/v, headache.     Patient Complaints: Left arm numbness, left fingertip numbness, left face numbness. Left scapula/upper back pain. As detailed  above      Review of Systems   Constitutional: Negative for chills, diaphoresis and fatigue.   Eyes: Negative for photophobia and visual disturbance.   Musculoskeletal: Positive for back pain.        Left scapula/upper back pain   Neurological: Positive for weakness (generzalized weakness, no focal weakness) and numbness. Negative for dizziness, tremors, seizures, syncope, facial asymmetry, speech difficulty, light-headedness and headaches.   All other systems reviewed and are negative.         Pertinent PMH:  has a past medical history of CAD (coronary artery disease), Diabetes (CMS/HCC), Hyperlipidemia, and Hypertension.   ________________________________________________     OBJECTIVE:  Pt examined at . He appears uncomfortable and often winces and stops talking momentarily during exam due to pain. He reports pain is worse with movement, but it hurst when sitting still as well.     Neurologic Exam    PHYSICAL EXAM:    CONSTITUTIONAL: The patient is in no apparent distress, appears uncomfortable. Bright awake and alert. There is no shortness of breath.     HEENT: There is no tenderness over the temporal arteries bilaterally. Normocephalic, atraumatic. TMJ open symmetrically without tenderness.    NECK: ROM normal, supple. No paraspinal tenderness    MUSCULOSKELETAL: Trigger point tenderness of the left trapezius with slight edema. No palpable mass, redness, or signs of infection. No paraspinal tenderness.     RESPIRATORY: Breathing unlabored, Breath sounds are normal    CARDIAC: Regular rate and rhythm.     EXTREMITIES:  No clubbing, cyanosis or edema.    PSYCHIATRIC: Mood/affect normal, judgement normal, appropriate    NEUROLOGICAL:    Cognition:   Fully oriented.  Fund of knowledge excellent.  Concentration and attention normal.   Language normal with normal comprehension, fluent speech, intact repetition and naming.   Short and long term memory appears intact    Cranial nerves;    II - pupils bilaterally equal  reacting to light,  No new Visual field deficits;  Fundoscopic exam- Not able to be done, non-dilated exam  III,IV,VI: EOMI with no diplopia  V: Left facial numbness from temple to chin  VII: No facial asymmetry,  VIII: No New hearing abnormality  IX, X, XI: normal gag and shoulder shrug;  XII: tongue is in the midline.    Sensory:  Intact to light touch in RUE, RLE, and LLE. Decreased sensation through the C9-T1 distribution on the left arm/hand. Otherwise, sensation intact. No symptoms with ulnar manipulation.     Motor: Strength 5/5 bilaterally upper and lower extremities. No involuntary movements present. Normal tone and bulk.  Deep tendon reflexes: 2/4 and symmetrical in biceps, brachioradialis, triceps, bilateral 2/4 knees and ankles. Both plantars are flexor.    Cerebellar: Finger to nose and mirror movements normal bilaterally.    Gait and balance: deferred      ________________________________________________   RESULTS REVIEW    VITAL SIGNS:  Temp:  [97.7 °F (36.5 °C)-98.8 °F (37.1 °C)] 98.4 °F (36.9 °C)  Heart Rate:  [50-70] 54  Resp:  [14-15] 14  BP: (107-178)/(54-90) 107/54    LABS:   Lab Results   Component Value Date    WBC 4.90 06/07/2021    HGB 11.4 (L) 06/07/2021    HCT 34.0 (L) 06/07/2021    MCV 85.0 06/07/2021     (L) 06/07/2021     Lab Results   Component Value Date    GLUCOSE 145 (H) 06/07/2021    BUN 27 (H) 06/07/2021    CREATININE 1.97 (H) 06/07/2021    EGFRIFNONA 34 (L) 06/07/2021    BCR 13.7 06/07/2021    K 4.4 06/07/2021    CO2 27.0 06/07/2021    CALCIUM 8.7 06/07/2021    ALBUMIN 3.90 06/05/2021    LABIL2 1.3 06/27/2019    AST 19 06/05/2021    ALT 27 06/05/2021       Lab Results   Component Value Date    TSH 3.510 06/06/2021    LDL 38 06/06/2021    HGBA1C 9.8 (H) 06/06/2021    TGGVGWNA14 387 06/06/2021         IMAGING STUDIES:  XR Spine Cervical 2 or 3 View    Result Date: 6/5/2021  No acute osseous abnormality within the visualized spine. Mild to moderate multilevel degenerative  changes are present throughout the spine.  Electronically Signed By-Vangie Thapa MD On:6/5/2021 5:19 PM This report was finalized on [id number] by  Vangie Thapa MD.    XR Spine Thoracic 3 View    Result Date: 6/5/2021  No acute osseous abnormality. Mild to moderate degenerative changes are present.  Electronically Signed By-Vangie Thapa MD On:6/5/2021 5:19 PM This report was finalized on [id number] by  Vangie Thapa MD.    CT Head Without Contrast    Result Date: 6/5/2021  No evidence of hemorrhage, mass effect or midline shift. No acute process identified.  Electronically Signed By-Vangie Thapa MD On:6/5/2021 5:20 PM This report was finalized on [id number] by  aVngie Thapa MD.    CT Angiogram Neck    Result Date: 6/7/2021   1. Atherosclerotic plaque in both carotid bifurcations without hemodynamically significant narrowing in the carotid bifurcations or anterior circulation. 2. Small caliber vertebral arteries with a markedly irregular basilar artery. Evidence of significant disease in both posterior cerebral arteries.  Electronically Signed By-Donato Guerrero MD On:6/7/2021 9:27 AM This report was finalized on [id number] by  Donato Guerrero MD.    MRI Brain Without Contrast    Result Date: 6/5/2021  No evidence of hemorrhage, mass effect or midline shift. No evidence of recent or acute ischemia. Mild periventricular and subcortical FLAIR signal changes are present likely related to chronic microvascular ischemic change.   Electronically Signed By-Vangie Thapa MD On:6/5/2021 8:00 PM This report was finalized on [id number] by  Vangie Thapa MD.    MRI Cervical Spine Without Contrast    Result Date: 6/5/2021  No acute osseous abnormality. Multilevel degenerative changes are present throughout the spine with multifocal canal stenosis present as above. Varying degrees of neural foraminal narrowing as described above most pronounced on the left at C3-C4 and C6-C7.   Electronically Signed By-Vangie Thapa MD  On:6/5/2021 8:03 PM This report was finalized on [id number] by  Vangie Thapa MD.    CT Angiogram Head w AI Analysis of LVO    Result Date: 6/7/2021   1. Atherosclerotic plaque in both carotid bifurcations without hemodynamically significant narrowing in the carotid bifurcations or anterior circulation. 2. Small caliber vertebral arteries with a markedly irregular basilar artery. Evidence of significant disease in both posterior cerebral arteries.  Electronically Signed By-Donato Guerrero MD On:6/7/2021 9:27 AM This report was finalized on [id number] by  Donato Guerrero MD.      I reviewed the patient's new clinical results.    ________________________________________________      PROBLEM LIST:    Left facial numbness        Assessment/Plan   ASSESSMENT/PLAN:  1. Acute left facial numbness without asymmetry. 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 Symptoms started yesterday with worsening today. Initial MRI brain and CTA negative for stroke or dissection. Repeat MRI brain with fine cuts through the brainstems is negative for stroke.    - CT head:  No evidence of hemorrhage, mass effect or midline shift. No acute  process identified.  - CTA head and neck: Atherosclerotic plaque in both carotid bifurcations without hemodynamically significant narrowing in the carotid bifurcations or anterior circulation. Small caliber vertebral arteries with a markedly irregular basilar artery. Evidence of significant disease in both posterior cerebral arteries.  - MRI brain: No evidence of hemorrhage, mass effect or midline shift. No evidence of  recent or acute ischemia. Mild periventricular and subcortical FLAIR signal changes are present likely related to chronic microvascular ischemic change  - Repeat MRI brain (6/7):  No acute intracranial findings and no significant change from  6/5/2021.  Mild atrophy and scattered mild chronic microvascular disease changes. Signs of old punctate bleed along  the subependymal margin of the left lateral ventricle. Mild ethmoid sinus disease  - EKG: Sinus rhythm. Atrial premature complex. Prolonged AR interval. RBBB and LAFB  - Labs: A1C: 9.8, B12: 387, LDL: 38, TSH: 3.51  - Antithrombotics: Continue ASA 81 mg and Plavix 75mg daily (home meds)  - Statin: Lipitor 40mg qhs  - PT/OT/ST as appropriate, Neuro checks per protocol, DVT prophylaxis, Stroke education  - Will try Neurontin 300mg BID.   - Discussed with neurosurgery, Dr. Echavarria. Pt will not likely need surgery. He does not have the classic symptoms of trigeminal neuralgia with pain and it is suspected that symptoms will improve over time with conservative management.     2. Cervical radiculopathy with Left arm numbness (C8-T1 distribution) and left upper back/scapula pain with trigger point pain over the trapezius, trapezius myalgia. Symptoms started simultaneously about a week ago with no known injury, strain, or trauma. He went to the chiropractor after the pain developed and had no improvement after manipulation.   - MRI c-spine: No acute osseous abnormality. Multilevel degenerative changes are present throughout the spine with multifocal canal stenosis present. Varying degrees of neural foraminal narrowing as described above most pronounced on the left at C3-C4 and C6-C7.  - Lidocaine patch in place on the left scapula area, heat/ice PRN  - Consult neurosurgery    3. B12 Deficiency  - Replacement ordered  - Recommend cyanocobalamin 1000mcg PO daily or IM monthly      **Please refer to previous notes for further details and recommendations.     Will follow peripherally.     I discussed the patient's findings and my recommendations with patient, nursing staff and consulting provider    RONAN Floyd  06/07/21  10:26 EDT

## 2021-06-07 NOTE — THERAPY EVALUATION
Patient Name: Papito Gallo  : 1950    MRN: [id number]                              Today's Date: 2021       Admit Date: 2021    Visit Dx:     ICD-10-CM ICD-9-CM   1. Left facial numbness  R20.0 782.0   2. Numbness and tingling in left arm  R20.0 782.0    R20.2    3. Hyperglycemia  R73.9 790.29   4. Acute renal impairment  N28.9 593.9     Patient Active Problem List   Diagnosis   • Left facial numbness     Past Medical History:   Diagnosis Date   • CAD (coronary artery disease)    • Diabetes (CMS/HCC)    • Hyperlipidemia    • Hypertension      Past Surgical History:   Procedure Laterality Date   • CHOLECYSTECTOMY     • CORONARY STENT PLACEMENT     • KNEE ARTHROPLASTY     • LEFT HEART CATH       General Information     Row Name 21 1128          OT Time and Intention    Document Type  evaluation  -     Mode of Treatment  occupational therapy  -     Row Name 21 1128          General Information    Patient Profile Reviewed  yes  -KASSIDY     Prior Level of Function  independent:;ADL's;all household mobility;community mobility;driving  -     Existing Precautions/Restrictions  no known precautions/restrictions  -KASSIDY     Barriers to Rehab  none identified  -     Row Name 21 1128          Occupational Profile    Reason for Services/Referral (Occupational Profile)  Pt is a 71 y/o M admit after recent chiropractor visit for pain in upper back/mid scapula region. Pt now having severe numbness in his L face/arm. MRI noted multi level stenosis and neural foramina involvement. PMH includes HTN, DM, IDDM and CAD. Pt resides with his spouse in a house with few GABINO. Pt reports indep with ADLs and mobility PTA.  -     Row Name 21 1128          Living Environment    Lives With  spouse  -     Row Name 21 1128          Home Main Entrance    Number of Stairs, Main Entrance  three  -     Stair Railings, Main Entrance  railings safe and in good condition  -     Row Name 21 1128           Cognition    Orientation Status (Cognition)  oriented x 4  -     Row Name 06/07/21 1128          Safety Issues, Functional Mobility    Impairments Affecting Function (Mobility)  pain;sensation/sensory awareness;visual/perceptual;endurance/activity tolerance  -     Comment, Safety Issues/Impairments (Mobility)  gait belt and non skid socks used for safety  -       User Key  (r) = Recorded By, (t) = Taken By, (c) = Cosigned By    Initials Name Provider Type     Berkley Tucker OT Occupational Therapist          Mobility/ADL's     Row Name 06/07/21 1132          Bed Mobility    Bed Mobility  bed mobility (all) activities  -     All Activities, [place] (Bed Mobility)  standby assist  -     Row Name 06/07/21 1132          Transfers    Transfers  toilet transfer;sit-stand transfer  -     Comment (Transfers)  Pt requires CGA for ambulation 2/2 c/o vision issues.  -     Sit-Stand [place] (Transfers)  contact guard  -     [place] Level (Toilet Transfer)  contact guard;standby assist  -     Assistive Device (Toilet Transfer)  commode  -     Row Name 06/07/21 1132          Toilet Transfer    Type (Toilet Transfer)  stand-sit;sit-stand  -     Row Name 06/07/21 1132          Functional Mobility    Functional Mobility- Ind. Level  contact guard assist;1 person  -     Functional Mobility-Distance (Feet)  15  -     Functional Mobility- Comment  Pt requires CGA for ambulation 2/2 c/o vision issues. RN notified.  -     Row Name 06/07/21 1132          Activities of Daily Living    BADL Assessment/Intervention  toileting  -     Row Name 06/07/21 1132          Toileting Assessment/Training    [place] Level (Toileting)  perform perineal hygiene;supervision  -     Assistive Devices (Toileting)  commode;grab bar/safety frame  -     Position (Toileting)  unsupported sitting  -       User Key  (r) = Recorded By, (t) = Taken By, (c) = Cosigned By    Initials Name  Provider Type    Berkley Johnson OT Occupational Therapist        Obj/Interventions     Row Name 06/07/21 1134          Sensory Assessment (Somatosensory)    Sensory Assessment (Somatosensory)  other (see comments) Pt + Tinel's sign for cubitial tunnel. Pt reports impaired sensation in L ulnar nerve distribution beginning at cervical.  -KASSIDY     Row Name 06/07/21 1134          Vision Assessment/Intervention    Visual Impairment/Limitations  blurry vision;other (see comments) Pt reports recent vision issues, but denies blurry vision or blackouts. Pt unable to verbally describe the specific vision issue to OT. RN notified.  -KASSIDY     Row Name 06/07/21 1134          Range of Motion Comprehensive    Comment, General Range of Motion  BUEs WFL. Limited cervical ROM due to L scapular/back pain.  -     Row Name 06/07/21 1134          Strength Comprehensive (MMT)    Comment, General Manual Muscle Testing (MMT) Assessment  RUE: 5/5, LUE 4+/5  -     Row Name 06/07/21 1134          Balance    Balance Assessment  sitting static balance;standing static balance;standing dynamic balance  -KASSIDY     Static Sitting Balance  WFL;sitting, edge of bed;unsupported  -KASSIDY     Static Standing Balance  mild impairment;supported;standing  -KASSIDY     Dynamic Standing Balance  mild impairment;supported;standing  -KASSIDY     Balance Interventions  occupation based/functional task  -KASSIDY     Comment, Balance  Pt completed toileting with supervision at commode level. Pt requires CGA for mobility due to impaired balance 2/2 vision changes.  -KASSIDY       User Key  (r) = Recorded By, (t) = Taken By, (c) = Cosigned By    Initials Name Provider Type    Berkley Johnson OT Occupational Therapist        Goals/Plan     Row Name 06/07/21 1131          Transfer Goal 1 (OT)    Activity/Assistive Device (Transfer Goal 1, OT)  transfers, all  -KASSIDY     [place] Level/Cues Needed (Transfer Goal 1, OT)  modified independence  -KASSIDY     Time Frame (Transfer Goal  1, OT)  long term goal (LTG);2 weeks  -KASSIDY     Row Name 06/07/21 1139          Dressing Goal 1 (OT)    Activity/Device (Dressing Goal 1, OT)  dressing skills, all  -KASSIDY     [place]/Cues Needed (Dressing Goal 1, OT)  modified independence  -KASSIDY     Time Frame (Dressing Goal 1, OT)  long term goal (LTG);2 weeks  -KASSIDY     Row Name 06/07/21 1139          Grooming Goal 1 (OT)    Activity/Device (Grooming Goal 1, OT)  grooming skills, all  -KASSIDY     [place] (Grooming Goal 1, OT)  modified independence in standing at sinkside  -KASSIDY     Time Frame (Grooming Goal 1, OT)  long term goal (LTG);2 weeks  -KASSIDY     Row Name 06/07/21 1139          Therapy Assessment/Plan (OT)    Planned Therapy Interventions (OT)  activity tolerance training;functional balance retraining;occupation/activity based interventions;ROM/therapeutic exercise;strengthening exercise;transfer/mobility retraining;patient/caregiver education/training;neuromuscular control/coordination retraining;cognitive/visual perception retraining;edema control/reduction;BADL retraining;adaptive equipment training  -       User Key  (r) = Recorded By, (t) = Taken By, (c) = Cosigned By    Initials Name Provider Type    KASSIDY Berkley Tucker, TANIYA Occupational Therapist        Clinical Impression     Row Name 06/07/21 1137          Pain Scale: Numbers Pre/Post-Treatment    Pretreatment Pain Rating  4/10  -KASSIDY     Posttreatment Pain Rating  5/10  -     Row Name 06/07/21 1137          Pain Scale: FACES Pre/Post-Treatment    Pain Location - Side  Left  -     Pain Location  head  -     Row Name 06/07/21 1137          Plan of Care Review    Plan of Care Reviewed With  patient  -KASSIDY     Outcome Summary  Pt is a 69 y/o M admit after recent chiropractor visit for pain in upper back/mid scapula region. Pt now having severe numbness in his L face/arm. MRI noted multi level stenosis and neural foramina involvement. PMH includes HTN, DM, IDDM and CAD. Pt resides with his spouse  in a house with few GABINO. Pt reports indep with ADLs and mobility PTA. Pt requires CGA for mobility this date 2/2 reported vision changes (pt unable to specifically identify the vision deficit). Pt appears below ADL baseline, limited by pain, impaired sensation, impaired vision, impaired endurance, impaired balance and acuity of illness. OT recommends continued treatment 5x/week and d/c home with family assist as well as OP OT.  -KASSIDY     Row Name 06/07/21 1137          Therapy Assessment/Plan (OT)    Rehab Potential (OT)  good, to achieve stated therapy goals  -KASSIDY     Criteria for Skilled Therapeutic Interventions Met (OT)  skilled treatment is necessary;yes  -KASSIDY     Therapy Frequency (OT)  5 times/wk  -KASSIDY     Row Name 06/07/21 1137          Therapy Plan Review/Discharge Plan (OT)    Anticipated Discharge Disposition (OT)  home with outpatient therapy services  -     Row Name 06/07/21 1137          Vital Signs    Recovery Time  WFL on RA throughout session  -     Row Name 06/07/21 1137          Positioning and Restraints    Pre-Treatment Position  in bed  -KASSIDY     Post Treatment Position  bed  -KASSIDY     In Bed  notified nsg;side lying right;call light within reach;encouraged to call for assist requested heat pack for pain to RN  -KASSIDY       User Key  (r) = Recorded By, (t) = Taken By, (c) = Cosigned By    Initials Name Provider Type    Berkley Johnson, OT Occupational Therapist        Outcome Measures     Row Name 06/07/21 1140          How much help from another is currently needed...    Putting on and taking off regular lower body clothing?  3  -KASSIDY     Bathing (including washing, rinsing, and drying)  3  -KASSIDY     Toileting (which includes using toilet bed pan or urinal)  3  -KASSIDY     Putting on and taking off regular upper body clothing  4  -KASSIDY     Taking care of personal grooming (such as brushing teeth)  4  -KASSIDY     Eating meals  4  -KASSIDY     AM-PAC 6 Clicks Score (OT)  21  -KASSIDY     Row Name 06/07/21 1142           Modified Ridgeway Scale    Modified [place] Scale  4 - Moderately severe disability.  Unable to walk without assistance, and unable to attend to own bodily needs without assistance.  -KASSIDY     Row Name 06/07/21 1140          Functional Assessment    Outcome Measure Options  AM-PAC 6 Clicks Daily Activity (OT);Modified Jamila  -KASSIDY       User Key  (r) = Recorded By, (t) = Taken By, (c) = Cosigned By    Initials Name Provider Type    Berkley Johnson OT Occupational Therapist        Occupational Therapy Education                 Title: PT OT SLP Therapies (Done)     Topic: Occupational Therapy (Done)     Point: ADL training (Done)     Description:   Instruct learner(s) on proper safety adaptation and remediation techniques during self care or transfers.   Instruct in proper use of assistive devices.              Learning Progress Summary           Patient Acceptance, E,TB, VU by KASSIDY at 6/7/2021 1141                               User Key     Initials Effective Dates Name Provider Type Discipline    KASSIDY 07/15/20 -  Berkley Tucker OT Occupational Therapist OT              OT Recommendation and Plan  Planned Therapy Interventions (OT): activity tolerance training, functional balance retraining, occupation/activity based interventions, ROM/therapeutic exercise, strengthening exercise, transfer/mobility retraining, patient/caregiver education/training, neuromuscular control/coordination retraining, cognitive/visual perception retraining, edema control/reduction, BADL retraining, adaptive equipment training  Therapy Frequency (OT): 5 times/wk  Plan of Care Review  Plan of Care Reviewed With: patient  Outcome Summary: Pt is a 71 y/o M admit after recent chiropractor visit for pain in upper back/mid scapula region. Pt now having severe numbness in his L face/arm. MRI noted multi level stenosis and neural foramina involvement. PMH includes HTN, DM, IDDM and CAD. Pt resides with his spouse in a house with few GABINO. Pt  reports indep with ADLs and mobility PTA. Pt requires CGA for mobility this date 2/2 reported vision changes (pt unable to specifically identify the vision deficit). Pt appears below ADL baseline, limited by pain, impaired sensation, impaired vision, impaired endurance, impaired balance and acuity of illness. OT recommends continued treatment 5x/week and d/c home with family assist as well as OP OT.     Time Calculation:   Time Calculation- OT     Row Name 06/07/21 1141             Time Calculation- OT    OT Start Time  0927  -KASSIDY      OT Stop Time  0950  -KASSIDY      OT Time Calculation (min)  23 min  -KASSIDY      Total Timed Code Minutes- OT  15 minute(s)  -KASSIDY      OT Received On  06/07/21  -KASSIDY      OT - Next Appointment  06/08/21  -KASSIDY      OT Goal Re-Cert Due Date  06/21/21  -KASSIDY         Timed Charges    12195 - OT Self Care/Mgmt Minutes  15  -KASSIDY         Untimed Charges    OT Eval/Re-eval Minutes  8  -KASSIDY         Total Minutes    Timed Charges Total Minutes  15  -KASSIDY      Untimed Charges Total Minutes  8  -KASSIDY       Total Minutes  23  -KASSIDY        User Key  (r) = Recorded By, (t) = Taken By, (c) = Cosigned By    Initials Name Provider Type    Berkley Johnson OT Occupational Therapist        Therapy Charges for Today     Code Description Service Date Service Provider Modifiers Qty    56470223726 HC OT SELF CARE/MGMT/TRAIN EA 15 MIN 6/7/2021 Berkley Tucker OT GO 1    10897051750 HC OT EVAL LOW COMPLEXITY 3 6/7/2021 Berkley Tucker OT GO 1               Berkley Tucker OT  6/7/2021

## 2021-06-07 NOTE — CASE MANAGEMENT/SOCIAL WORK
Discharge Planning Assessment  [facility]     Patient Name: Papito Gallo  MRN: [id number]  Today's Date: 6/7/2021    Admit Date: 6/5/2021    Discharge Needs Assessment     Row Name 06/07/21 170       Living Environment    Lives With  spouse;child(maile), dependent    Current Living Arrangements  home/apartment/condo    Primary Care Provided by  self    Able to Return to Prior Arrangements  yes       Resource/Environmental Concerns    Resource/Environmental Concerns  none    Transportation Concerns  car, none       Transition Planning    Patient/Family Anticipates Transition to  home with family    Patient/Family Anticipated Services at Transition  outpatient care PT recommends OP PT- Patient is from [place], [place]. He will contact PCP and have him send an order to a faciity that is close to his home for OP PT - patient voiced understanding    Transportation Anticipated  car, drives self;family or friend will provide Daughter will transport to home at [place]       Discharge Needs Assessment    Equipment Currently Used at Home  none    Concerns to be Addressed  discharge planning    Anticipated Changes Related to Illness  none    Equipment Needed After Discharge  none        Discharge Plan     Row Name 06/07/21 4224       Plan    Plan  PT recommends OP PT - Patient will have PCP set up closer to home      Plan Comments  Spoke to patient in room with mask and goggles maintaining 6 ft for less than 15 min. Patient is I with ADL's and drives. He is able to afford his meds and food. PCP is Dinorah and pharmacy is [facility] in [place]. Possible DC today        Continued Care and Services - Admitted Since 6/5/2021        Expected Discharge Date and Time     Expected Discharge Date Expected Discharge Time    Jun 6, 2021         Demographic Summary     Row Name 06/07/21 1700       General Information    Admission Type  observation    Arrived From  emergency department    Required Notices Provided  Observation Status Notice    Referral  Source  admission list    Reason for Consult  discharge planning    Preferred Language  English        Functional Status     Row Name 06/07/21 1702       Functional Status, IADL    Medications  independent    Meal Preparation  independent    Housekeeping  independent    Laundry  independent    Shopping  independent       Mental Status    General Appearance WDL  WDL       Mental Status Summary    Recent Changes in Mental Status/Cognitive Functioning  no changes               Patient Forms     Row Name 06/07/21 1532       Patient Forms    Patient Observation Letter  Delivered Moon 6/7/21    Delivered to  Patient    Method of delivery  In person        Malinda Mcneil RN, CM  Office Phone [phone]  Cell [phone]

## 2021-06-07 NOTE — CONSULTS
"Diabetes Education  Assessment/Teaching    Patient Name:  Papito Gallo  YOB: 1950  MRN: [id number]  Admit Date:  6/5/2021      Assessment Date:  6/7/2021    Most Recent Value   General Information    Referral From:  A1c, Blood glucose [Pt seen due to A1c of 9.8% this adm and adm bs of 519.]   Height  172.7 cm (67.99\")   Height Method  Stated   Weight  85.2 kg (187 lb 13.3 oz)   Weight Method  Bed scale   Pregnancy Assessment   Diabetes History   What type of diabetes do you have?  Type 2   Length of Diabetes Diagnosis  -- [Pt thinks was diagnosed in 2004]   Do you test your blood sugar at home?  yes   Frequency of checks  daily in am   Meter type  Contour   Who performs the test?  self   Have you had low blood sugar? (<70mg/dl)  yes   How often do you have low blood sugar?  occasionally   Education Preferences   What areas of diabetes would you like to learn about?  avoiding high blood sugar, avoiding low blood sugar, diabetes complications, medications for diabetes, testing my blood sugar at home   Nutrition Information   Assessment Topics   Taking Medication - Assessment  Needs education   Problem Solving - Assessment  Needs education   Reducing Risk - Assessment  Needs education   Monitoring - Assessment  Needs education   DM Goals   Taking Medication - Goal  Today   Problem Solving - Goal  Today   Reducing Risk - Goal  Today   Monitoring - Goal  Today            Most Recent Value   DM Education Needs   Meter  Has own   Meter Type  Contour   Frequency of Testing  AC/HS [Discussed importance of checking bs ac and hs daily. Gave log book to record bs.]   Blood Glucose Target Range  Discussed A1c result of 9.8%. Reviewed healthy bs range and healthy A1c target. Discussed importance of bs control.   Medication  Oral, Insulin, Pen [Pt taking Tresiba 40 units hs and Farxiga 5 mg hs.]   Problem Solving  Hypoglycemia, Hyperglycemia, Signs, Symptoms, Treatment [Discussed importance of carrying low bs tx at " all times. Pt states he carries peanut butter and crackers. Discussed he needs to use quick-acting CHO and gave examples.]   Reducing Risks  A1C testing [Gave A1c info sheet.]   Motivation  Engaged   Teaching Method  Explanation, Discussion, Handouts   Patient Response  Verbalized understanding            Other Comments:  Discussed pt has been started on mealtime insulin. Reviewed importance of checking bs before meals, taking mealtime insulin and then eating within 15 minutes. Discussed if he is not eating a meal he should not be taking mealtime insulin. Reviewed injection sites, rotation of injection sites, and storage of insulin. Pt eating 1-3 meals/day. He has PCP and seeing every 3 months. Pt states has tresiba pens, pen needles and bs monitoring supplies. Rx started for mealtime insulin and more pen needles. Pt with no additional questions.         Electronically signed by:  Rajani Hernandez RN  06/07/21 16:01 EDT

## 2021-06-07 NOTE — PROGRESS NOTES
"      [facility] Medicine Services Daily Progress Note      Hospitalist Team  LOS 0 days      Patient Care Team:  Crow Copeland DO as PCP - General (Family Medicine)    Patient Location: 264/1      Subjective   Subjective     Chief Complaint / Subjective  Chief Complaint   Patient presents with   • Back Pain         Brief Synopsis of Hospital Course/HPI        Date::    6/6/21: Complains of left thoracic back pain close to right scapula tender to palpation.  Also complains of numbness of fourth and fifth digit left hand  6/7/21: Thoracic pain is better.  Still has numbness of fingers      Review of Systems   All other systems reviewed and are negative.        Objective   Objective      Vital Signs  Temp:  [97.7 °F (36.5 °C)-98.8 °F (37.1 °C)] 98.4 °F (36.9 °C)  Heart Rate:  [50-70] 54  Resp:  [14-15] 14  BP: (107-178)/(54-90) 107/54  Oxygen Therapy  SpO2: 96 %  Pulse Oximetry Type: Intermittent  Device (Oxygen Therapy): room air  Flowsheet Rows      First Filed Value   Admission Height  172.7 cm (68\") Documented at 06/05/2021 1527   Admission Weight  87.1 kg (192 lb) Documented at 06/05/2021 1527        Intake & Output (last 3 days)       06/04 0701 - 06/05 0700 06/05 0701 - 06/06 0700 06/06 0701 - 06/07 0700 06/07 0701 - 06/08 0700    P.O.   120     IV Piggyback  2000      Total Intake(mL/kg)  2000 (23.5) 120 (1.4)     Urine (mL/kg/hr)   550 (0.3)     Total Output   550     Net  +2000 -430                 Lines, Drains & Airways    Active LDAs     Name:   Placement date:   Placement time:   Site:   Days:    Peripheral IV 06/05/21 1532 Right Forearm   06/05/21    1532    Forearm   less than 1                  Physical Exam:    Physical Exam  HENT:      Head: Normocephalic.      Nose: Nose normal.   Eyes:      General: No scleral icterus.     Extraocular Movements: Extraocular movements intact.      Pupils: Pupils are equal, round, and reactive to light.   Cardiovascular:      Rate and Rhythm: " Normal rate.   Pulmonary:      Effort: Pulmonary effort is normal.   Abdominal:      General: Bowel sounds are normal.   Musculoskeletal:         General: Normal range of motion.      Cervical back: Normal range of motion.   Skin:     General: Skin is warm.   Neurological:      Mental Status: He is alert. Mental status is at baseline.   Psychiatric:         Mood and Affect: Mood normal.           Procedures:      Results Review:     I reviewed the patient's new clinical results.      Lab Results (last 24 hours)     Procedure Component Value Units Date/Time    POC Glucose Once [[id number]]  (Abnormal) Collected: 06/07/21 0738    Specimen: Blood Updated: 06/07/21 0740     Glucose 118 mg/dL      Comment: Serial Number: [id number]:  [id number]       Basic Metabolic Panel [[id number]]  (Abnormal) Collected: 06/07/21 0256    Specimen: Blood Updated: 06/07/21 0428     Glucose 145 mg/dL      BUN 27 mg/dL      Creatinine 1.97 mg/dL      Sodium 137 mmol/L      Potassium 4.4 mmol/L      Chloride 104 mmol/L      CO2 27.0 mmol/L      Calcium 8.7 mg/dL      eGFR Non African Amer 34 mL/min/1.73      BUN/Creatinine Ratio 13.7     Anion Gap 6.0 mmol/L     Narrative:      GFR Normal >60  Chronic Kidney Disease <60  Kidney Failure <15      Magnesium [[id number]]  (Normal) Collected: 06/07/21 0256    Specimen: Blood Updated: 06/07/21 0428     Magnesium 2.0 mg/dL     CK [[id number]]  (Normal) Collected: 06/07/21 0256    Specimen: Blood Updated: 06/07/21 0428     Creatine Kinase 45 U/L     Sedimentation Rate [[id number]]  (Normal) Collected: 06/07/21 0256    Specimen: Blood Updated: 06/07/21 0408     Sed Rate 11 mm/hr     CBC & Differential [[id number]]  (Abnormal) Collected: 06/07/21 0256    Specimen: Blood Updated: 06/07/21 0358    Narrative:      The following orders were created for panel order CBC & Differential.  Procedure                               Abnormality         Status                     ---------                                -----------         ------                     CBC Auto Differential[[id number]]        Abnormal            Final result                 Please view results for these tests on the individual orders.    CBC Auto Differential [[id number]]  (Abnormal) Collected: 06/07/21 0256    Specimen: Blood Updated: 06/07/21 0358     WBC 4.90 10*3/mm3      RBC 4.00 10*6/mm3      Hemoglobin 11.4 g/dL      Hematocrit 34.0 %      MCV 85.0 fL      MCH 28.4 pg      MCHC 33.5 g/dL      RDW 13.5 %      RDW-SD 40.7 fl      MPV 9.1 fL      Platelets 138 10*3/mm3      Neutrophil % 63.7 %      Lymphocyte % 24.5 %      Monocyte % 9.0 %      Eosinophil % 2.4 %      Basophil % 0.4 %      Neutrophils, Absolute 3.10 10*3/mm3      Lymphocytes, Absolute 1.20 10*3/mm3      Monocytes, Absolute 0.40 10*3/mm3      Eosinophils, Absolute 0.10 10*3/mm3      Basophils, Absolute 0.00 10*3/mm3      nRBC 0.0 /100 WBC     Hemoglobin A1c [[id number]]  (Abnormal) Collected: 06/06/21 0740    Specimen: Blood Updated: 06/06/21 2009     Hemoglobin A1C 9.8 %     Narrative:      Hemoglobin A1C Reference Range:    <5.7 %        Normal  5.7-6.4 %     Increased risk for diabetes  > 6.4 %        Diabetes       These guidelines have been recommended by the American Diabetic Association for Hgb A1c.      The following 2010 guidelines have been recommended by the American Diabetes Association for Hemoglobin A1c.    HBA1c 5.7-6.4% Increased risk for future diabetes (pre-diabetes)  HBA1c     >6.4% Diabetes      POC Glucose Once [[id number]]  (Abnormal) Collected: 06/06/21 2007    Specimen: Blood Updated: 06/06/21 2008     Glucose 228 mg/dL      Comment: Serial Number: [id number]:  [id number]       POC Glucose Once [[id number]]  (Abnormal) Collected: 06/06/21 1607    Specimen: Blood Updated: 06/06/21 1608     Glucose 195 mg/dL      Comment: Serial Number: [id number]:  [id number]           Hemoglobin A1C   Date Value Ref Range Status   06/06/2021 9.8 (H) 3.5 - 5.6 %  Final           Results from last 7 days   Lab Units 06/05/21  1750   PH, ARTERIAL pH units 7.330*   PO2 ART mm Hg 34.8*   PCO2, ARTERIAL mm Hg 45.1   HCO3 ART mmol/L 23.8     No results found for: LIPASE  Lab Results   Component Value Date    CHOL 93 06/06/2021    TRIG 117 06/06/2021    HDL 34 (L) 06/06/2021    LDL 38 06/06/2021       No results found for: INTRAOP, PREDX, FINALDX, COMDX    Microbiology Results (last 10 days)     Procedure Component Value - Date/Time    COVID-19,CEPHEID,COR/CASSANDRA/PAD/OFELIA IN-HOUSE(OR EMERGENT/ADD-ON),NP SWAB IN TRANSPORT MEDIA 3-4 HR TAT, RT-PCR - Swab, Nasopharynx [[id number]]  (Normal) Collected: 06/06/21 1001    Lab Status: Final result Specimen: Swab from Nasopharynx Updated: 06/06/21 1227     COVID19 Not Detected    Narrative:      Fact sheet for providers: https://www.fda.gov/media/485778/download     Fact sheet for patients: https://www.fda.gov/media/698178/download  Fact sheet for providers: https://www.fda.gov/media/971254/download     Fact sheet for patients: https://www.fda.gov/media/892350/download          ECG/EMG Results (most recent)     Procedure Component Value Units Date/Time    ECG 12 Lead [[id number]] Collected: 06/05/21 1716     Updated: 06/06/21 0839     QT Interval 452 ms     Narrative:      HEART RATE= 70  bpm  RR Interval= 856  ms  NM Interval= 236  ms  P Horizontal Axis= -62  deg  P Front Axis= 45  deg  QRSD Interval= 158  ms  QT Interval= 452  ms  QRS Axis= -67  deg  T Wave Axis= 51  deg  - ABNORMAL ECG -  Sinus rhythm  Atrial premature complex  Prolonged NM interval  RBBB and LAFB  Electronically Signed By: Kameron Zambrano ([facility]) 06-Jun-2021 08:39:32  Date and Time of Study: 2021-06-05 17:16:15                  XR Spine Cervical 2 or 3 View    Result Date: 6/5/2021  No acute osseous abnormality within the visualized spine. Mild to moderate multilevel degenerative changes are present throughout the spine.  Electronically Signed By-Vangie Thapa MD On:6/5/2021  5:19 PM This report was finalized on [id number] by  Vangie Thapa MD.    XR Spine Thoracic 3 View    Result Date: 6/5/2021  No acute osseous abnormality. Mild to moderate degenerative changes are present.  Electronically Signed By-Vangie Thapa MD On:6/5/2021 5:19 PM This report was finalized on [id number] by  Vangie Thapa MD.    CT Head Without Contrast    Result Date: 6/5/2021  No evidence of hemorrhage, mass effect or midline shift. No acute process identified.  Electronically Signed By-Vangie Thapa MD On:6/5/2021 5:20 PM This report was finalized on [id number] by  Vangie Thapa MD.    CT Angiogram Neck    Result Date: 6/7/2021   1. Atherosclerotic plaque in both carotid bifurcations without hemodynamically significant narrowing in the carotid bifurcations or anterior circulation. 2. Small caliber vertebral arteries with a markedly irregular basilar artery. Evidence of significant disease in both posterior cerebral arteries.  Electronically Signed By-Donato Guerrero MD On:6/7/2021 9:27 AM This report was finalized on [id number] by  Donato Guerrero MD.    MRI Brain Without Contrast    Result Date: 6/5/2021  No evidence of hemorrhage, mass effect or midline shift. No evidence of recent or acute ischemia. Mild periventricular and subcortical FLAIR signal changes are present likely related to chronic microvascular ischemic change.   Electronically Signed By-Vangie Thapa MD On:6/5/2021 8:00 PM This report was finalized on [id number] by  Vangie Thapa MD.    MRI Cervical Spine Without Contrast    Result Date: 6/5/2021  No acute osseous abnormality. Multilevel degenerative changes are present throughout the spine with multifocal canal stenosis present as above. Varying degrees of neural foraminal narrowing as described above most pronounced on the left at C3-C4 and C6-C7.   Electronically Signed By-Vangie Thapa MD On:6/5/2021 8:03 PM This report was finalized on [id number] by  Vangie Thapa MD.    CT  Angiogram Head w AI Analysis of LVO    Result Date: 6/7/2021   1. Atherosclerotic plaque in both carotid bifurcations without hemodynamically significant narrowing in the carotid bifurcations or anterior circulation. 2. Small caliber vertebral arteries with a markedly irregular basilar artery. Evidence of significant disease in both posterior cerebral arteries.  Electronically Signed By-Donato Guerrero MD On:6/7/2021 9:27 AM This report was finalized on [id number] by  Donato Guerrero MD.          Xrays, labs reviewed personally by physician.    Medication Review:   I have reviewed the patient's current medication list      Scheduled Meds  amLODIPine, 10 mg, Oral, Daily  aspirin, 81 mg, Oral, Daily  atorvastatin, 40 mg, Oral, Nightly  Canagliflozin, 100 mg, Oral, Daily  citalopram, 10 mg, Oral, Daily  cloNIDine, 0.2 mg, Oral, Q8H  clopidogrel, 75 mg, Oral, Daily  cyclobenzaprine, 10 mg, Oral, TID  enoxaparin, 40 mg, Subcutaneous, Daily  gabapentin, 100 mg, Oral, Q8H  insulin glargine, 45 Units, Subcutaneous, Nightly  insulin lispro, 0-14 Units, Subcutaneous, TID AC  insulin lispro, 5 Units, Subcutaneous, TID With Meals  lidocaine, 1 patch, Transdermal, Q24H  pantoprazole, 40 mg, Oral, QAM AC  sodium chloride, 10 mL, Intravenous, Q12H        Meds Infusions  sodium chloride, 75 mL/hr, Last Rate: 75 mL/hr (06/05/21 2312)        Meds PRN  •  acetaminophen  •  dextrose  •  dextrose  •  glucagon (human recombinant)  •  HYDROcodone-acetaminophen  •  insulin lispro **AND** insulin lispro  •  LORazepam  •  ondansetron  •  sodium chloride  •  [COMPLETED] Insert peripheral IV **AND** sodium chloride  •  sodium chloride        Assessment/Plan   Assessment/Plan     Active Hospital Problems    Diagnosis  POA   • Left facial numbness [R20.0]  Yes      Resolved Hospital Problems   No resolved problems to display.       MEDICAL DECISION MAKING COMPLEXITY BY PROBLEM:     1.  Upper back pain with left-sided radiculopathy.  -Evaluated by  neurosurgery  -s/p CT head, CTA head and neck, MRI brain/cervical spine  -We will give trial of muscle relaxers and lidocaine patch  -Spine surgery consulted     2.  Uncontrolled IDDM.  -Continue ISS and Lantus     3.  Hypertension  -Continue amlodipine, clonidine     4.  Hyperlipidemia  -Continue statin.     5.  CAD-s/p 3 coronary stents  -on aspirin and Plavix.    VTE Prophylaxis -   Mechanical Order History:     None      Pharmalogical Order History:      Ordered     Dose Route Frequency Stop    06/05/21 2209  enoxaparin (LOVENOX) syringe 40 mg      40 mg SC Daily --                  Code Status -   Code Status and Medical Interventions:   Ordered at: 06/05/21 2209     Code Status:    CPR     Medical Interventions (Level of Support Prior to Arrest):    Full       This patient has been examined wearing appropriate Personal Protective Equipment and discussed with nursing. 06/07/21        Discharge Planning  defer        Electronically signed by Niko May DO, 06/07/21, 11:02 EDT.  [facility] Hospitalist Team

## 2021-06-08 PROBLEM — R20.0 NUMBNESS AND TINGLING IN LEFT ARM: Status: ACTIVE | Noted: 2021-06-08

## 2021-06-08 PROBLEM — R20.2 NUMBNESS AND TINGLING IN LEFT ARM: Status: ACTIVE | Noted: 2021-06-08

## 2021-06-08 LAB
ANION GAP SERPL CALCULATED.3IONS-SCNC: 11 MMOL/L (ref 5–15)
BASOPHILS # BLD AUTO: 0 10*3/MM3 (ref 0–0.2)
BASOPHILS NFR BLD AUTO: 0.2 % (ref 0–1.5)
BUN SERPL-MCNC: 31 MG/DL (ref 8–23)
BUN/CREAT SERPL: 15.8 (ref 7–25)
CALCIUM SPEC-SCNC: 9.1 MG/DL (ref 8.6–10.5)
CHLORIDE SERPL-SCNC: 101 MMOL/L (ref 98–107)
CO2 SERPL-SCNC: 24 MMOL/L (ref 22–29)
CREAT SERPL-MCNC: 1.96 MG/DL (ref 0.76–1.27)
DEPRECATED RDW RBC AUTO: 41.1 FL (ref 37–54)
EOSINOPHIL # BLD AUTO: 0 10*3/MM3 (ref 0–0.4)
EOSINOPHIL NFR BLD AUTO: 0.1 % (ref 0.3–6.2)
ERYTHROCYTE [DISTWIDTH] IN BLOOD BY AUTOMATED COUNT: 13.7 % (ref 12.3–15.4)
GFR SERPL CREATININE-BSD FRML MDRD: 34 ML/MIN/1.73
GLUCOSE BLDC GLUCOMTR-MCNC: 222 MG/DL (ref 70–105)
GLUCOSE BLDC GLUCOMTR-MCNC: 285 MG/DL (ref 70–105)
GLUCOSE BLDC GLUCOMTR-MCNC: 286 MG/DL (ref 70–105)
GLUCOSE BLDC GLUCOMTR-MCNC: 377 MG/DL (ref 70–105)
GLUCOSE SERPL-MCNC: 215 MG/DL (ref 65–99)
HCT VFR BLD AUTO: 37 % (ref 37.5–51)
HGB BLD-MCNC: 12.4 G/DL (ref 13–17.7)
LYMPHOCYTES # BLD AUTO: 0.5 10*3/MM3 (ref 0.7–3.1)
LYMPHOCYTES NFR BLD AUTO: 7.8 % (ref 19.6–45.3)
MAGNESIUM SERPL-MCNC: 2 MG/DL (ref 1.6–2.4)
MCH RBC QN AUTO: 28.6 PG (ref 26.6–33)
MCHC RBC AUTO-ENTMCNC: 33.4 G/DL (ref 31.5–35.7)
MCV RBC AUTO: 85.6 FL (ref 79–97)
MONOCYTES # BLD AUTO: 0.1 10*3/MM3 (ref 0.1–0.9)
MONOCYTES NFR BLD AUTO: 0.9 % (ref 5–12)
NEUTROPHILS NFR BLD AUTO: 5.6 10*3/MM3 (ref 1.7–7)
NEUTROPHILS NFR BLD AUTO: 91 % (ref 42.7–76)
NRBC BLD AUTO-RTO: 0.1 /100 WBC (ref 0–0.2)
PLATELET # BLD AUTO: 143 10*3/MM3 (ref 140–450)
PMV BLD AUTO: 9.4 FL (ref 6–12)
POTASSIUM SERPL-SCNC: 4.7 MMOL/L (ref 3.5–5.2)
RBC # BLD AUTO: 4.32 10*6/MM3 (ref 4.14–5.8)
SODIUM SERPL-SCNC: 136 MMOL/L (ref 136–145)
WBC # BLD AUTO: 6.1 10*3/MM3 (ref 3.4–10.8)

## 2021-06-08 PROCEDURE — 63710000001 INSULIN GLARGINE PER 5 UNITS: Performed by: INTERNAL MEDICINE

## 2021-06-08 PROCEDURE — 99232 SBSQ HOSP IP/OBS MODERATE 35: CPT | Performed by: NURSE PRACTITIONER

## 2021-06-08 PROCEDURE — 99233 SBSQ HOSP IP/OBS HIGH 50: CPT | Performed by: NURSE PRACTITIONER

## 2021-06-08 PROCEDURE — 99232 SBSQ HOSP IP/OBS MODERATE 35: CPT | Performed by: HOSPITALIST

## 2021-06-08 PROCEDURE — 80048 BASIC METABOLIC PNL TOTAL CA: CPT | Performed by: HOSPITALIST

## 2021-06-08 PROCEDURE — 63710000001 INSULIN LISPRO (HUMAN) PER 5 UNITS: Performed by: INTERNAL MEDICINE

## 2021-06-08 PROCEDURE — 83735 ASSAY OF MAGNESIUM: CPT | Performed by: HOSPITALIST

## 2021-06-08 PROCEDURE — G0108 DIAB MANAGE TRN  PER INDIV: HCPCS

## 2021-06-08 PROCEDURE — 85025 COMPLETE CBC W/AUTO DIFF WBC: CPT | Performed by: HOSPITALIST

## 2021-06-08 PROCEDURE — 82962 GLUCOSE BLOOD TEST: CPT

## 2021-06-08 PROCEDURE — 25010000002 LORAZEPAM PER 2 MG: Performed by: HOSPITALIST

## 2021-06-08 PROCEDURE — 25010000002 ENOXAPARIN PER 10 MG: Performed by: INTERNAL MEDICINE

## 2021-06-08 PROCEDURE — 25010000003 HYDROCORTISONE SOD SUCCINATE PF 250 MG RECONSTITUTED SOLUTION: Performed by: NEUROLOGICAL SURGERY

## 2021-06-08 RX ORDER — INSULIN LISPRO 100 [IU]/ML
5 INJECTION, SOLUTION INTRAVENOUS; SUBCUTANEOUS
Qty: 3 ML | Refills: 0 | Status: SHIPPED | OUTPATIENT
Start: 2021-06-08

## 2021-06-08 RX ORDER — LANCETS
1 EACH MISCELLANEOUS
Qty: 100 EACH | Refills: 12 | Status: SHIPPED | OUTPATIENT
Start: 2021-06-08

## 2021-06-08 RX ORDER — BLOOD SUGAR DIAGNOSTIC
1 STRIP MISCELLANEOUS
Qty: 150 EACH | Refills: 12 | Status: SHIPPED | OUTPATIENT
Start: 2021-06-08

## 2021-06-08 RX ORDER — POLYETHYLENE GLYCOL 3350 17 G/17G
17 POWDER, FOR SOLUTION ORAL DAILY
Status: DISCONTINUED | OUTPATIENT
Start: 2021-06-08 | End: 2021-06-09 | Stop reason: HOSPADM

## 2021-06-08 RX ORDER — PREDNISONE 20 MG/1
40 TABLET ORAL DAILY
Qty: 10 TABLET | Refills: 0 | Status: SHIPPED | OUTPATIENT
Start: 2021-06-08 | End: 2021-06-08 | Stop reason: HOSPADM

## 2021-06-08 RX ORDER — CYCLOBENZAPRINE HCL 10 MG
10 TABLET ORAL 3 TIMES DAILY
Qty: 30 TABLET | Refills: 0 | Status: SHIPPED | OUTPATIENT
Start: 2021-06-08

## 2021-06-08 RX ORDER — GABAPENTIN 300 MG/1
300 CAPSULE ORAL 2 TIMES DAILY
Qty: 30 CAPSULE | Refills: 0 | Status: SHIPPED | OUTPATIENT
Start: 2021-06-08

## 2021-06-08 RX ORDER — AMOXICILLIN 250 MG
2 CAPSULE ORAL 2 TIMES DAILY
Status: DISCONTINUED | OUTPATIENT
Start: 2021-06-08 | End: 2021-06-09 | Stop reason: HOSPADM

## 2021-06-08 RX ORDER — PEN NEEDLE, DIABETIC 30 GX3/16"
1 NEEDLE, DISPOSABLE MISCELLANEOUS
Qty: 200 EACH | Refills: 2 | Status: SHIPPED | OUTPATIENT
Start: 2021-06-08

## 2021-06-08 RX ADMIN — Medication 10 ML: at 20:52

## 2021-06-08 RX ADMIN — CYCLOBENZAPRINE HYDROCHLORIDE 10 MG: 10 TABLET, FILM COATED ORAL at 20:52

## 2021-06-08 RX ADMIN — GABAPENTIN 300 MG: 300 CAPSULE ORAL at 20:52

## 2021-06-08 RX ADMIN — INSULIN LISPRO 8 UNITS: 100 INJECTION, SOLUTION INTRAVENOUS; SUBCUTANEOUS at 13:50

## 2021-06-08 RX ADMIN — PANTOPRAZOLE SODIUM 40 MG: 40 TABLET, DELAYED RELEASE ORAL at 09:37

## 2021-06-08 RX ADMIN — CANAGLIFLOZIN 100 MG: 300 TABLET, FILM COATED ORAL at 09:35

## 2021-06-08 RX ADMIN — INSULIN GLARGINE 45 UNITS: 100 INJECTION, SOLUTION SUBCUTANEOUS at 20:53

## 2021-06-08 RX ADMIN — HYDROCORTISONE SODIUM SUCCINATE 250 MG: 250 INJECTION, POWDER, FOR SOLUTION INTRAMUSCULAR; INTRAVENOUS at 06:14

## 2021-06-08 RX ADMIN — ENOXAPARIN SODIUM 40 MG: 40 INJECTION SUBCUTANEOUS at 15:28

## 2021-06-08 RX ADMIN — INSULIN LISPRO 5 UNITS: 100 INJECTION, SOLUTION INTRAVENOUS; SUBCUTANEOUS at 17:53

## 2021-06-08 RX ADMIN — ATORVASTATIN CALCIUM 40 MG: 40 TABLET, FILM COATED ORAL at 20:52

## 2021-06-08 RX ADMIN — LORAZEPAM 1 MG: 2 INJECTION INTRAMUSCULAR; INTRAVENOUS at 21:45

## 2021-06-08 RX ADMIN — INSULIN LISPRO 5 UNITS: 100 INJECTION, SOLUTION INTRAVENOUS; SUBCUTANEOUS at 09:38

## 2021-06-08 RX ADMIN — HYDROCODONE BITARTRATE AND ACETAMINOPHEN 1 TABLET: 7.5; 325 TABLET ORAL at 17:53

## 2021-06-08 RX ADMIN — INSULIN LISPRO 5 UNITS: 100 INJECTION, SOLUTION INTRAVENOUS; SUBCUTANEOUS at 13:51

## 2021-06-08 RX ADMIN — ASPIRIN 81 MG: 81 TABLET, CHEWABLE ORAL at 09:37

## 2021-06-08 RX ADMIN — CLONIDINE HYDROCHLORIDE 0.2 MG: 0.1 TABLET ORAL at 21:45

## 2021-06-08 RX ADMIN — DOCUSATE SODIUM 50 MG AND SENNOSIDES 8.6 MG 2 TABLET: 8.6; 5 TABLET, FILM COATED ORAL at 20:52

## 2021-06-08 RX ADMIN — HYDROCORTISONE SODIUM SUCCINATE 250 MG: 250 INJECTION, POWDER, FOR SOLUTION INTRAMUSCULAR; INTRAVENOUS at 13:51

## 2021-06-08 RX ADMIN — GABAPENTIN 300 MG: 300 CAPSULE ORAL at 09:37

## 2021-06-08 RX ADMIN — CLONIDINE HYDROCHLORIDE 0.2 MG: 0.1 TABLET ORAL at 06:14

## 2021-06-08 RX ADMIN — CYCLOBENZAPRINE HYDROCHLORIDE 10 MG: 10 TABLET, FILM COATED ORAL at 09:37

## 2021-06-08 RX ADMIN — LIDOCAINE 1 PATCH: 50 PATCH TOPICAL at 09:35

## 2021-06-08 RX ADMIN — Medication 10 ML: at 09:39

## 2021-06-08 RX ADMIN — CITALOPRAM HYDROBROMIDE 10 MG: 20 TABLET ORAL at 09:37

## 2021-06-08 RX ADMIN — INSULIN LISPRO 8 UNITS: 100 INJECTION, SOLUTION INTRAVENOUS; SUBCUTANEOUS at 17:53

## 2021-06-08 RX ADMIN — CYCLOBENZAPRINE HYDROCHLORIDE 10 MG: 10 TABLET, FILM COATED ORAL at 15:28

## 2021-06-08 RX ADMIN — POLYETHYLENE GLYCOL 3350 17 G: 17 POWDER, FOR SOLUTION ORAL at 18:48

## 2021-06-08 RX ADMIN — CLOPIDOGREL BISULFATE 75 MG: 75 TABLET ORAL at 09:37

## 2021-06-08 RX ADMIN — CLONIDINE HYDROCHLORIDE 0.2 MG: 0.1 TABLET ORAL at 13:51

## 2021-06-08 RX ADMIN — HYDROCODONE BITARTRATE AND ACETAMINOPHEN 1 TABLET: 7.5; 325 TABLET ORAL at 09:37

## 2021-06-08 RX ADMIN — AMLODIPINE BESYLATE 10 MG: 5 TABLET ORAL at 09:37

## 2021-06-08 RX ADMIN — HYDROCORTISONE SODIUM SUCCINATE 250 MG: 250 INJECTION, POWDER, FOR SOLUTION INTRAMUSCULAR; INTRAVENOUS at 00:26

## 2021-06-08 RX ADMIN — HYDROCODONE BITARTRATE AND ACETAMINOPHEN 1 TABLET: 7.5; 325 TABLET ORAL at 21:45

## 2021-06-08 RX ADMIN — HYDROCORTISONE SODIUM SUCCINATE 250 MG: 250 INJECTION, POWDER, FOR SOLUTION INTRAMUSCULAR; INTRAVENOUS at 18:48

## 2021-06-08 NOTE — PROGRESS NOTES
LOS: 0 days   Patient Care Team:  Crow Copeland DO as PCP - General (Family Medicine)    Chief Complaint: Facial numbness and arm numbness with some left scapular pain    Subjective         Interval History:     History taken from: patient   Patient reports that the steroids were helping with his numbness but this actually finds that he has had some increased left lower leg numbness/weakness that began this afternoon.  He also experienced some double vision this morning that has resolved.  He also has some left-sided scapular pain.    Objective      Alert and oriented by 3  Speech is intact and coherent articulate with good content and production  Cranial nerves III through XII are grossly intact with pupils symmetric and reactive and no gaze paresis or nystagmus  Sensation is decreased left forehead, left face, left fingers, and left shin area   Motor strength is 5/5 in both upper and lower extremities with no focal motor deficits  Reflexes are 2+ in both upper and lower extremities with no upper motor neuron signs  Gait is intact      Vital Signs  Temp:  [97.7 °F (36.5 °C)-98.9 °F (37.2 °C)] 98.9 °F (37.2 °C)  Heart Rate:  [51-67] 67  Resp:  [12-18] 12  BP: (128-138)/(56-75) 128/68       Results Review:     I reviewed the patient's new clinical results.  MRI demonstrates stenosis C3-C4, C5-C6 and C6-C7.  It is not severe but definitely narrowed per Dr. Echavarria's interpretation.    Assessment/Plan       Numbness and tingling in left arm    Left facial numbness    Hyperlipidemia    Hypertension    CAD (coronary artery disease)    Diabetes (CMS/HCC)    Patient has had some new onset of numbness as well as some transient double vision.  He does have some left-sided scapular pain that he did not report yesterday as well.  I did recommend that neurology follow back up due to the double vision.  I did discuss this with the on-call neurosurgeon who is now Dr. Velasquez who agrees.  Patient's blood sugar has remained  very high and since steroids have not been much of a benefit we will discontinue.   We will follow-up tomorrow with patient.  This patient was examined wearing appropriate personal protective equipment.     I discussed my findings with patient, nursing staff and Dr. Velasquez.    Monique Joya, RONAN  06/08/21  17:00 EDT

## 2021-06-08 NOTE — PROGRESS NOTES
"      [facility] Medicine Services Daily Progress Note      Hospitalist Team  LOS 0 days      Patient Care Team:  Crow Copeland DO as PCP - General (Family Medicine)    Patient Location: 264/1      Subjective   Subjective     Chief Complaint / Subjective  Chief Complaint   Patient presents with   • Back Pain         Brief Synopsis of Hospital Course/HPI        Date::    6/6/21: Complains of left thoracic back pain close to right scapula tender to palpation.  Also complains of numbness of fourth and fifth digit left hand  6/7/21: Thoracic pain is better.  Still has numbness of fingers  6/8/21: Back pain better.  Still has left hand numbness.  Claims left leg numbness.      Review of Systems   All other systems reviewed and are negative.        Objective   Objective      Vital Signs  Temp:  [97.7 °F (36.5 °C)-98.9 °F (37.2 °C)] 98.1 °F (36.7 °C)  Heart Rate:  [51-72] 72  Resp:  [12-18] 15  BP: (126-138)/(56-75) 126/61  Oxygen Therapy  SpO2: 96 %  Pulse Oximetry Type: Intermittent  Device (Oxygen Therapy): room air  Flowsheet Rows      First Filed Value   Admission Height  172.7 cm (68\") Documented at 06/05/2021 1527   Admission Weight  87.1 kg (192 lb) Documented at 06/05/2021 1527        Intake & Output (last 3 days)       06/05 0701 - 06/06 0700 06/06 0701 - 06/07 0700 06/07 0701 - 06/08 0700 06/08 0701 - 06/09 0700    P.O.  120 444 222    IV Piggyback 2000       Total Intake(mL/kg) 2000 (23.5) 120 (1.4) 444 (5.2) 222 (2.6)    Urine (mL/kg/hr)  550 (0.3)      Total Output  550      Net +2000 -430 +444 +222                Lines, Drains & Airways    Active LDAs     Name:   Placement date:   Placement time:   Site:   Days:    Peripheral IV 06/05/21 1532 Right Forearm   06/05/21    1532    Forearm   less than 1                  Physical Exam:    Physical Exam  HENT:      Head: Normocephalic.      Nose: Nose normal.   Eyes:      General: No scleral icterus.     Extraocular Movements: Extraocular " movements intact.      Pupils: Pupils are equal, round, and reactive to light.   Cardiovascular:      Rate and Rhythm: Normal rate.   Pulmonary:      Effort: Pulmonary effort is normal.   Abdominal:      General: Bowel sounds are normal.   Musculoskeletal:         General: Normal range of motion.      Cervical back: Normal range of motion.   Skin:     General: Skin is warm.   Neurological:      Mental Status: He is alert. Mental status is at baseline.   Psychiatric:         Mood and Affect: Mood normal.           Procedures:      Results Review:     I reviewed the patient's new clinical results.      Lab Results (last 24 hours)     Procedure Component Value Units Date/Time    POC Glucose Once [[id number]]  (Abnormal) Collected: 06/08/21 1607    Specimen: Blood Updated: 06/08/21 1608     Glucose 285 mg/dL      Comment: Serial Number: [id number]:  [id number]       POC Glucose Once [[id number]]  (Abnormal) Collected: 06/08/21 1126    Specimen: Blood Updated: 06/08/21 1128     Glucose 286 mg/dL      Comment: Serial Number: [id number]:  [id number]       Basic Metabolic Panel [[id number]]  (Abnormal) Collected: 06/08/21 0708    Specimen: Blood Updated: 06/08/21 0834     Glucose 215 mg/dL      BUN 31 mg/dL      Creatinine 1.96 mg/dL      Sodium 136 mmol/L      Potassium 4.7 mmol/L      Chloride 101 mmol/L      CO2 24.0 mmol/L      Calcium 9.1 mg/dL      eGFR Non African Amer 34 mL/min/1.73      BUN/Creatinine Ratio 15.8     Anion Gap 11.0 mmol/L     Narrative:      GFR Normal >60  Chronic Kidney Disease <60  Kidney Failure <15      Magnesium [[id number]]  (Normal) Collected: 06/08/21 0708    Specimen: Blood Updated: 06/08/21 0834     Magnesium 2.0 mg/dL     CBC & Differential [[id number]]  (Abnormal) Collected: 06/08/21 0708    Specimen: Blood Updated: 06/08/21 0815    Narrative:      The following orders were created for panel order CBC & Differential.  Procedure                               Abnormality          Status                     ---------                               -----------         ------                     CBC Auto Differential[[id number]]        Abnormal            Final result                 Please view results for these tests on the individual orders.    CBC Auto Differential [[id number]]  (Abnormal) Collected: 06/08/21 0708    Specimen: Blood Updated: 06/08/21 0815     WBC 6.10 10*3/mm3      RBC 4.32 10*6/mm3      Hemoglobin 12.4 g/dL      Hematocrit 37.0 %      MCV 85.6 fL      MCH 28.6 pg      MCHC 33.4 g/dL      RDW 13.7 %      RDW-SD 41.1 fl      MPV 9.4 fL      Platelets 143 10*3/mm3      Neutrophil % 91.0 %      Lymphocyte % 7.8 %      Monocyte % 0.9 %      Eosinophil % 0.1 %      Basophil % 0.2 %      Neutrophils, Absolute 5.60 10*3/mm3      Lymphocytes, Absolute 0.50 10*3/mm3      Monocytes, Absolute 0.10 10*3/mm3      Eosinophils, Absolute 0.00 10*3/mm3      Basophils, Absolute 0.00 10*3/mm3      nRBC 0.1 /100 WBC     POC Glucose Once [[id number]]  (Abnormal) Collected: 06/08/21 0758    Specimen: Blood Updated: 06/08/21 0800     Glucose 222 mg/dL      Comment: Serial Number: [id number]:  [id number]           Hemoglobin A1C   Date Value Ref Range Status   06/06/2021 9.8 (H) 3.5 - 5.6 % Final           Results from last 7 days   Lab Units 06/05/21  1750   PH, ARTERIAL pH units 7.330*   PO2 ART mm Hg 34.8*   PCO2, ARTERIAL mm Hg 45.1   HCO3 ART mmol/L 23.8     No results found for: LIPASE  Lab Results   Component Value Date    CHOL 93 06/06/2021    TRIG 117 06/06/2021    HDL 34 (L) 06/06/2021    LDL 38 06/06/2021       No results found for: INTRAOP, PREDX, FINALDX, COMDX    Microbiology Results (last 10 days)     Procedure Component Value - Date/Time    COVID-19,CEPHEID,COR/CASSANDRA/PAD/OFELIA IN-HOUSE(OR EMERGENT/ADD-ON),NP SWAB IN TRANSPORT MEDIA 3-4 HR TAT, RT-PCR - Swab, Nasopharynx [[id number]]  (Normal) Collected: 06/06/21 1001    Lab Status: Final result Specimen: Swab from Nasopharynx Updated:  06/06/21 1227     COVID19 Not Detected    Narrative:      Fact sheet for providers: https://www.fda.gov/media/449696/download     Fact sheet for patients: https://www.fda.gov/media/284988/download  Fact sheet for providers: https://www.fda.gov/media/213261/download     Fact sheet for patients: https://www.fda.gov/media/802009/download          ECG/EMG Results (most recent)     Procedure Component Value Units Date/Time    ECG 12 Lead [[id number]] Collected: 06/05/21 1716     Updated: 06/06/21 0839     QT Interval 452 ms     Narrative:      HEART RATE= 70  bpm  RR Interval= 856  ms  WA Interval= 236  ms  P Horizontal Axis= -62  deg  P Front Axis= 45  deg  QRSD Interval= 158  ms  QT Interval= 452  ms  QRS Axis= -67  deg  T Wave Axis= 51  deg  - ABNORMAL ECG -  Sinus rhythm  Atrial premature complex  Prolonged WA interval  RBBB and LAFB  Electronically Signed By: Kameron Zambrano ([facility]) 06-Jun-2021 08:39:32  Date and Time of Study: 2021-06-05 17:16:15                  XR Spine Cervical 2 or 3 View    Result Date: 6/5/2021  No acute osseous abnormality within the visualized spine. Mild to moderate multilevel degenerative changes are present throughout the spine.  Electronically Signed By-Vangie Thapa MD On:6/5/2021 5:19 PM This report was finalized on [id number] by  Vangie Thapa MD.    XR Spine Thoracic 3 View    Result Date: 6/5/2021  No acute osseous abnormality. Mild to moderate degenerative changes are present.  Electronically Signed By-Vangie Thapa MD On:6/5/2021 5:19 PM This report was finalized on [id number] by  Vangie Thapa MD.    CT Head Without Contrast    Result Date: 6/5/2021  No evidence of hemorrhage, mass effect or midline shift. No acute process identified.  Electronically Signed By-Vangie Thapa MD On:6/5/2021 5:20 PM This report was finalized on [id number] by  Vangie Thapa MD.    CT Angiogram Neck    Result Date: 6/7/2021   1. Atherosclerotic plaque in both carotid bifurcations without  hemodynamically significant narrowing in the carotid bifurcations or anterior circulation. 2. Small caliber vertebral arteries with a markedly irregular basilar artery. Evidence of significant disease in both posterior cerebral arteries.  Electronically Signed By-Donato Guerrero MD On:6/7/2021 9:27 AM This report was finalized on [id number] by  Donato Guerrero MD.    MRI Brain Without Contrast    Result Date: 6/7/2021   1. No acute intracranial findings and no significant change from 6/5/2021. 2. Mild atrophy and scattered mild chronic microvascular disease changes. 3. Signs of old punctate bleed along the subependymal margin of the left lateral ventricle. 4. Mild ethmoid sinus disease.   Electronically Signed By-Leeanne Mejia MD On:6/7/2021 12:59 PM This report was finalized on [id number] by  Leeanne Mejia MD.    MRI Brain Without Contrast    Result Date: 6/5/2021  No evidence of hemorrhage, mass effect or midline shift. No evidence of recent or acute ischemia. Mild periventricular and subcortical FLAIR signal changes are present likely related to chronic microvascular ischemic change.   Electronically Signed By-Vangie Thapa MD On:6/5/2021 8:00 PM This report was finalized on [id number] by  Vangie Thapa MD.    MRI Cervical Spine Without Contrast    Result Date: 6/5/2021  No acute osseous abnormality. Multilevel degenerative changes are present throughout the spine with multifocal canal stenosis present as above. Varying degrees of neural foraminal narrowing as described above most pronounced on the left at C3-C4 and C6-C7.   Electronically Signed By-Vangie Thapa MD On:6/5/2021 8:03 PM This report was finalized on [id number] by  Vangie Thapa MD.    CT Angiogram Head w AI Analysis of LVO    Result Date: 6/7/2021   1. Atherosclerotic plaque in both carotid bifurcations without hemodynamically significant narrowing in the carotid bifurcations or anterior circulation. 2. Small caliber vertebral arteries with a  markedly irregular basilar artery. Evidence of significant disease in both posterior cerebral arteries.  Electronically Signed By-Donato Guerrero MD On:6/7/2021 9:27 AM This report was finalized on [id number] by  Donato Guerrero MD.          Xrays, labs reviewed personally by physician.    Medication Review:   I have reviewed the patient's current medication list      Scheduled Meds  amLODIPine, 10 mg, Oral, Daily  aspirin, 81 mg, Oral, Daily  atorvastatin, 40 mg, Oral, Nightly  Canagliflozin, 100 mg, Oral, Daily  citalopram, 10 mg, Oral, Daily  cloNIDine, 0.2 mg, Oral, Q8H  clopidogrel, 75 mg, Oral, Daily  cyclobenzaprine, 10 mg, Oral, TID  enoxaparin, 40 mg, Subcutaneous, Daily  gabapentin, 300 mg, Oral, BID  hydrocortisone sodium succinate, 250 mg, Intravenous, Q6H  insulin glargine, 45 Units, Subcutaneous, Nightly  insulin lispro, 0-14 Units, Subcutaneous, TID AC  insulin lispro, 5 Units, Subcutaneous, TID With Meals  lidocaine, 1 patch, Transdermal, Q24H  pantoprazole, 40 mg, Oral, QAM AC  sodium chloride, 10 mL, Intravenous, Q12H        Meds Infusions  sodium chloride, 75 mL/hr, Last Rate: 75 mL/hr (06/05/21 2312)        Meds PRN  •  acetaminophen  •  dextrose  •  dextrose  •  glucagon (human recombinant)  •  HYDROcodone-acetaminophen  •  insulin lispro **AND** insulin lispro  •  LORazepam  •  ondansetron  •  sodium chloride  •  [COMPLETED] Insert peripheral IV **AND** sodium chloride  •  sodium chloride        Assessment/Plan   Assessment/Plan     Active Hospital Problems    Diagnosis  POA   • **Numbness and tingling in left arm [R20.0, R20.2]  Unknown     Priority: High   • Left facial numbness [R20.0]  Yes     Priority: High   • Hyperlipidemia [E78.5]  Yes     Priority: Medium   • Hypertension [I10]  Yes     Priority: Medium   • CAD (coronary artery disease) [I25.10]  Yes     Priority: Medium   • Diabetes (CMS/HCC) [E11.9]  Yes     Priority: Medium      Resolved Hospital Problems   No resolved problems to  display.       MEDICAL DECISION MAKING COMPLEXITY BY PROBLEM:     1.  Upper back pain with left-sided radiculopathy.  -Evaluated by neurosurgery  -s/p CT head, CTA head and neck, MRI brain/cervical spine  -We will give trial of muscle relaxers and lidocaine patch  -Spine surgery consulted     2.  Uncontrolled IDDM.  -Continue ISS and Lantus     3.  Hypertension  -Continue amlodipine, clonidine     4.  Hyperlipidemia  -Continue statin.     5.  CAD-s/p 3 coronary stents  -on aspirin and Plavix.    VTE Prophylaxis -   Mechanical Order History:     None      Pharmalogical Order History:      Ordered     Dose Route Frequency Stop    06/05/21 2209  enoxaparin (LOVENOX) syringe 40 mg      40 mg SC Daily --                  Code Status -   Code Status and Medical Interventions:   Ordered at: 06/05/21 2209     Code Status:    CPR     Medical Interventions (Level of Support Prior to Arrest):    Full       This patient has been examined wearing appropriate Personal Protective Equipment and discussed with nursing. 06/08/21        Discharge Planning  defer        Electronically signed by Niko May DO, 06/08/21, 17:39 EDT.  [facility] Hospitalist Team

## 2021-06-08 NOTE — CONSULTS
Diabetes Education    Patient Name:  Papito Gallo  YOB: 1950  MRN: [id number]  Admit Date:  6/5/2021    Follow up note: received consult due to A1c >7%. Pt received education 6/7/2021. Educator asked pt if he had any further education needs. Pt asking when to take mealtime insulin. Reviewed when to take mealtime insulin. Discussed importance of checking bs ac, then taking insulin, then eating meal within 15 minutes. Discussed not taking mealtime insulin if not eating. Reviewed current doses of long-acting and mealtime insulin. Pt agreeable to checking bs ac and recording bs. Reviewed use of the insulin pen. Pt has not been priming pen or counting 10 seconds before withdrawing needle from tissue after he has injected the insulin. Pt able to verbalize proper use of insulin pen. Gave insulin pen instruction sheet. Pt states he would like new bs meter. Gave the Accuchek Guide Me and instructed pt in use of procedure. He performed return demo correctly. Pt verbalized understanding of all info. He has no further questions.       Electronically signed by:  Rajani Hernandez RN  06/08/21 13:00 EDT

## 2021-06-08 NOTE — PLAN OF CARE
Problem: Adult Inpatient Plan of Care  Goal: Plan of Care Review  Outcome: Ongoing, Progressing  Goal: Patient-Specific Goal (Individualized)  Outcome: Ongoing, Progressing  Goal: Absence of Hospital-Acquired Illness or Injury  Outcome: Ongoing, Progressing  Intervention: Identify and Manage Fall Risk  Recent Flowsheet Documentation  Taken 6/8/2021 0421 by Rosangela Xavier RN  Safety Promotion/Fall Prevention: safety round/check completed  Taken 6/8/2021 0042 by Rosangela Xavier RN  Safety Promotion/Fall Prevention: safety round/check completed  Taken 6/7/2021 2019 by Rosangela Xavier RN  Safety Promotion/Fall Prevention: safety round/check completed  Goal: Optimal Comfort and Wellbeing  Outcome: Ongoing, Progressing  Goal: Readiness for Transition of Care  Outcome: Ongoing, Progressing     Problem: Adjustment to Illness (Stroke, Ischemic/Transient Ischemic Attack)  Goal: Optimal Coping  Outcome: Ongoing, Progressing     Problem: Bowel Elimination Impaired (Stroke, Ischemic/Transient Ischemic Attack)  Goal: Effective Bowel Elimination  Outcome: Ongoing, Progressing     Problem: Cerebral Tissue Perfusion Risk (Stroke, Ischemic/Transient Ischemic Attack)  Goal: Optimal Cerebral Tissue Perfusion  Outcome: Ongoing, Progressing     Problem: Communication Impairment (Stroke, Ischemic/Transient Ischemic Attack)  Goal: Improved Communication Skills  Outcome: Ongoing, Progressing     Problem: Eating/Swallowing Impairment (Stroke, Ischemic/Transient Ischemic Attack)  Goal: Oral Intake without Aspiration  Outcome: Ongoing, Progressing     Problem: Functional Ability Impaired (Stroke, Ischemic/Transient Ischemic Attack)  Goal: Optimal Functional Ability  Outcome: Ongoing, Progressing     Problem: Hemodynamic Instability (Stroke, Ischemic/Transient Ischemic Attack)  Goal: Vital Signs Remain in Desired Range  Outcome: Ongoing, Progressing     Problem: Pain (Stroke, Ischemic/Transient Ischemic Attack)  Goal: Acceptable Pain  Control  Outcome: Ongoing, Progressing     Problem: Sensorimotor Impairment (Stroke, Ischemic/Transient Ischemic Attack)  Goal: Improved Sensorimotor Function  Outcome: Ongoing, Progressing     Problem: Urinary Elimination Impaired (Stroke, Ischemic/Transient Ischemic Attack)  Goal: Effective Urinary Elimination  Outcome: Ongoing, Progressing   Goal Outcome Evaluation:

## 2021-06-08 NOTE — PROGRESS NOTES
LOS: 0 days     Chief Complaint:  Left sided numbness and tingling       SUBJECTIVE:  History taken from: patient chart RN    Interval History: The patient is a 70 year old gentleman with hypertension, DM II, CAD, HLD who presented to  [facility] of  [facility] secondary to upper back pain as well as numbness and tingling sensation of the left side of face and arm.  A work up for acute stroke was initiated.  The MRI of Brain was unremarkable.  MRI of C-Spine showed multi level spinal stenosis and neural foraminal involvement. He continue to have numbness of the  Left forearm and 4th and 5th fingers of left hand.    - Portions of the above HPI were copied from previous encounters and edited as appropriate.    Pt states he developed spontaneous left upper back/scapula pain about 1-2 weeks ago. He does not recall any injury, over use, or strain to that area. He also noted left arm  (C8-T1 distribution) with numbness of the left middle, ring, and little finger tips. The symptoms started at the same time. He took OTC ibuprofen at night without improvement so he went to a chiropractor. He did not notice any improvement or immediate worsening of symptoms. He has had worsening shoulder/upper back pain over the past few days which prevents him from sleeping at times. He has severe point tenderness over the trapezius. Lidocaine patch has not helped much.     On 6/6, pt developed numbness of the left half of his lips and jaw. On 6/7, he stated the facial numbness has progressed to include nearly the whole left half of his face. He c/o vague vision changes, but is unable to describe the changes. He denies any visual field deficit or blurred vision. He denies any other areas of numbness other than the left arm that is unchanged. No facial asymmetry, focal weakness, dizziness, speech changes, n/v, headache.     Patient Complaints:  Pt states his facial numbness is still present. He has a strange sensation in his LLE while trying to  ambulate today, but does not c/o specific weakness or numbness in the leg. Left shoulder/upper back pain persists with some improvement.     Pt states he lives 2 hours away and would like to go back home to get a second opinion. He was in town for his son's memorial service. He cares for his 3 & 4 year old grandchildren.    Review of Systems   Constitutional: Positive for fatigue. Negative for chills and diaphoresis.   Eyes: Negative for photophobia and visual disturbance.   Musculoskeletal: Positive for back pain.        Left scapula/upper back pain   Neurological: Positive for weakness (generzalized weakness, no focal weakness) and numbness. Negative for dizziness, tremors, seizures, syncope, facial asymmetry, speech difficulty, light-headedness and headaches.   All other systems reviewed and are negative.         Pertinent PMH:  has a past medical history of CAD (coronary artery disease), Diabetes (CMS/HCC), Hyperlipidemia, and Hypertension.   ________________________________________________     OBJECTIVE:  Pt examined at . He is resting comfortably and arouses easily.     Neurologic Exam    On exam:  GENERAL: NAD, awake and alert. Resting comfortably, but appears uncomfortable with movement on the left   HEENT: Normocephalic, Atraumatic. Oral mucosa clear and moist.   RESP: Breathing unlabored, breath sounds normal  CARDIO: RRR  SKIN: Warm, dry. No apparent rash.   PSYCH: Mood/affect flat    NEURO:  Oriented x3  EOMI, PERRL, no visual field deficits  No facial asymmetry  Full left facial/scalp numbness  Speech clear without dysarthria  Sensations intact and equal bilaterally  Strength 5/5 and equal in all extremities  No ataxia        ________________________________________________   RESULTS REVIEW    VITAL SIGNS:  Temp:  [97.7 °F (36.5 °C)-98.6 °F (37 °C)] 97.7 °F (36.5 °C)  Heart Rate:  [51-62] 62  Resp:  [12-18] 16  BP: (107-138)/(52-75) 135/75    LABS:   Lab Results   Component Value Date    WBC 6.10  06/08/2021    HGB 12.4 (L) 06/08/2021    HCT 37.0 (L) 06/08/2021    MCV 85.6 06/08/2021     06/08/2021     Lab Results   Component Value Date    GLUCOSE 215 (H) 06/08/2021    BUN 31 (H) 06/08/2021    CREATININE 1.96 (H) 06/08/2021    EGFRIFNONA 34 (L) 06/08/2021    BCR 15.8 06/08/2021    K 4.7 06/08/2021    CO2 24.0 06/08/2021    CALCIUM 9.1 06/08/2021    ALBUMIN 3.90 06/05/2021    LABIL2 1.3 06/27/2019    AST 19 06/05/2021    ALT 27 06/05/2021       Lab Results   Component Value Date    TSH 3.510 06/06/2021    LDL 38 06/06/2021    HGBA1C 9.8 (H) 06/06/2021    [id number] 387 06/06/2021         IMAGING STUDIES:  MRI Brain Without Contrast    Result Date: 6/7/2021   1. No acute intracranial findings and no significant change from 6/5/2021. 2. Mild atrophy and scattered mild chronic microvascular disease changes. 3. Signs of old punctate bleed along the subependymal margin of the left lateral ventricle. 4. Mild ethmoid sinus disease.   Electronically Signed By-Leeanne Mejia MD On:6/7/2021 12:59 PM This report was finalized on [id number] by  Leeanne Mejia MD.      I reviewed the patient's new clinical results.    ________________________________________________      PROBLEM LIST:    Left facial numbness        Assessment/Plan   ASSESSMENT/PLAN:  1. Acute left facial numbness without asymmetry. 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 Pt does not c/o of pain that is typically seen with trigeminal involvement. Repeat MRI brain negative for stroke.    - Labs: A1C: 9.8, B12: 387, LDL: 38, TSH: 3.51  - Antithrombotics: Continue ASA 81 mg and Plavix 75mg daily (home meds)  - Statin: Lipitor 40mg qhs  - 6/7: Trial of Neurontin 300mg BID.   - It is suspected that symptoms will improve over time with conservative management as cervical spine issues are managed.   - Pt would like to be discharged as he lives 2 hours away and he cares for his 3 & 4 year old grandchildren.    2.  Cervical radiculopathy with Left arm numbness (C8-T1 distribution) and left upper back/scapula pain with trigger point pain over the trapezius, trapezius myalgia. Symptoms started simultaneously about a week ago with no known injury, strain, or trauma. He went to the chiropractor after the pain developed and had no improvement after manipulation.   - Neurosurgery following, plans for IV steroids and conservative treatment for now with options of epidural blocks before considering surgery.   - Lidocaine patch in place on the left scapula area, heat/ice PRN    3. B12 Deficiency  - Replacement ordered  - Recommend cyanocobalamin 1000mcg PO daily or IM monthly      **Please refer to previous notes for further details and recommendations.     Will sign off. Please call with questions or concerns.       I discussed the patient's findings and my recommendations with patient, nursing staff and consulting provider    RONAN Floyd  06/08/21  09:33 EDT

## 2021-06-08 NOTE — CONSULTS
Diabetes Education    Patient Name:  Papito Gallo  YOB: 1950  MRN: [id number]  Admit Date:  6/5/2021    Follow up note: rxs started for Accuchek Guide test strips and Accuchek Softclix lancets. Secure chat sent to MD to request sending these through at discharge.       Electronically signed by:  Rajani Hernandez RN  06/08/21 13:08 EDT

## 2021-06-08 NOTE — PLAN OF CARE
Goal Outcome Evaluation:                 Problem: Adult Inpatient Plan of Care  Goal: Absence of Hospital-Acquired Illness or Injury  Intervention: Identify and Manage Fall Risk  Recent Flowsheet Documentation  Taken 6/8/2021 1400 by Chiquis Stoddard, RN  Safety Promotion/Fall Prevention:   safety round/check completed   room organization consistent   nonskid shoes/slippers when out of bed   fall prevention program maintained   clutter free environment maintained   assistive device/personal items within reach  Taken 6/8/2021 1200 by Chiquis Stoddard, RN  Safety Promotion/Fall Prevention:   safety round/check completed   room organization consistent   nonskid shoes/slippers when out of bed   fall prevention program maintained   assistive device/personal items within reach   clutter free environment maintained   activity supervised  Taken 6/8/2021 1000 by Chiquis Stoddard, RN  Safety Promotion/Fall Prevention:   safety round/check completed   room organization consistent   nonskid shoes/slippers when out of bed   fall prevention program maintained   clutter free environment maintained   assistive device/personal items within reach  Taken 6/8/2021 0800 by Chiquis Stoddard, RN  Safety Promotion/Fall Prevention:   safety round/check completed   room organization consistent   nonskid shoes/slippers when out of bed   fall prevention program maintained   assistive device/personal items within reach     Problem: Adult Inpatient Plan of Care  Goal: Absence of Hospital-Acquired Illness or Injury  Intervention: Prevent Skin Injury  Recent Flowsheet Documentation  Taken 6/8/2021 0800 by Chiquis Stoddard, RN  Skin Protection: adhesive use limited

## 2021-06-09 ENCOUNTER — APPOINTMENT (OUTPATIENT)
Dept: MRI IMAGING | Facility: HOSPITAL | Age: 71
End: 2021-06-09

## 2021-06-09 VITALS
WEIGHT: 194.22 LBS | SYSTOLIC BLOOD PRESSURE: 160 MMHG | DIASTOLIC BLOOD PRESSURE: 76 MMHG | RESPIRATION RATE: 16 BRPM | HEIGHT: 68 IN | OXYGEN SATURATION: 95 % | HEART RATE: 60 BPM | TEMPERATURE: 98.5 F | BODY MASS INDEX: 29.44 KG/M2

## 2021-06-09 PROBLEM — M89.8X1 PAIN OF LEFT SCAPULA: Status: ACTIVE | Noted: 2021-06-09

## 2021-06-09 PROBLEM — M54.12 CERVICAL RADICULOPATHY: Status: ACTIVE | Noted: 2021-06-09

## 2021-06-09 LAB
ANION GAP SERPL CALCULATED.3IONS-SCNC: 12 MMOL/L (ref 5–15)
BASOPHILS # BLD AUTO: 0 10*3/MM3 (ref 0–0.2)
BASOPHILS NFR BLD AUTO: 0.1 % (ref 0–1.5)
BUN SERPL-MCNC: 37 MG/DL (ref 8–23)
BUN/CREAT SERPL: 16.4 (ref 7–25)
CALCIUM SPEC-SCNC: 9 MG/DL (ref 8.6–10.5)
CHLORIDE SERPL-SCNC: 98 MMOL/L (ref 98–107)
CO2 SERPL-SCNC: 24 MMOL/L (ref 22–29)
CREAT SERPL-MCNC: 2.25 MG/DL (ref 0.76–1.27)
DEPRECATED RDW RBC AUTO: 40.3 FL (ref 37–54)
EOSINOPHIL # BLD AUTO: 0 10*3/MM3 (ref 0–0.4)
EOSINOPHIL NFR BLD AUTO: 0 % (ref 0.3–6.2)
ERYTHROCYTE [DISTWIDTH] IN BLOOD BY AUTOMATED COUNT: 13.5 % (ref 12.3–15.4)
GFR SERPL CREATININE-BSD FRML MDRD: 29 ML/MIN/1.73
GLUCOSE BLDC GLUCOMTR-MCNC: 239 MG/DL (ref 70–105)
GLUCOSE BLDC GLUCOMTR-MCNC: 303 MG/DL (ref 70–105)
GLUCOSE BLDC GLUCOMTR-MCNC: 345 MG/DL (ref 70–105)
GLUCOSE SERPL-MCNC: 294 MG/DL (ref 65–99)
HCT VFR BLD AUTO: 34.9 % (ref 37.5–51)
HGB BLD-MCNC: 11.8 G/DL (ref 13–17.7)
LYMPHOCYTES # BLD AUTO: 0.5 10*3/MM3 (ref 0.7–3.1)
LYMPHOCYTES NFR BLD AUTO: 4.2 % (ref 19.6–45.3)
MAGNESIUM SERPL-MCNC: 2 MG/DL (ref 1.6–2.4)
MCH RBC QN AUTO: 28.4 PG (ref 26.6–33)
MCHC RBC AUTO-ENTMCNC: 33.8 G/DL (ref 31.5–35.7)
MCV RBC AUTO: 84 FL (ref 79–97)
MONOCYTES # BLD AUTO: 0.2 10*3/MM3 (ref 0.1–0.9)
MONOCYTES NFR BLD AUTO: 1.6 % (ref 5–12)
NEUTROPHILS NFR BLD AUTO: 11.1 10*3/MM3 (ref 1.7–7)
NEUTROPHILS NFR BLD AUTO: 94.1 % (ref 42.7–76)
NRBC BLD AUTO-RTO: 0 /100 WBC (ref 0–0.2)
PLATELET # BLD AUTO: 152 10*3/MM3 (ref 140–450)
PMV BLD AUTO: 9 FL (ref 6–12)
POTASSIUM SERPL-SCNC: 4 MMOL/L (ref 3.5–5.2)
RBC # BLD AUTO: 4.15 10*6/MM3 (ref 4.14–5.8)
SODIUM SERPL-SCNC: 134 MMOL/L (ref 136–145)
WBC # BLD AUTO: 11.8 10*3/MM3 (ref 3.4–10.8)

## 2021-06-09 PROCEDURE — 82962 GLUCOSE BLOOD TEST: CPT

## 2021-06-09 PROCEDURE — 25010000003 HYDROCORTISONE SOD SUCCINATE PF 250 MG RECONSTITUTED SOLUTION: Performed by: NEUROLOGICAL SURGERY

## 2021-06-09 PROCEDURE — 83735 ASSAY OF MAGNESIUM: CPT | Performed by: HOSPITALIST

## 2021-06-09 PROCEDURE — 63710000001 INSULIN LISPRO (HUMAN) PER 5 UNITS: Performed by: INTERNAL MEDICINE

## 2021-06-09 PROCEDURE — 99238 HOSP IP/OBS DSCHRG MGMT 30/<: CPT | Performed by: HOSPITALIST

## 2021-06-09 PROCEDURE — 70551 MRI BRAIN STEM W/O DYE: CPT

## 2021-06-09 PROCEDURE — 99232 SBSQ HOSP IP/OBS MODERATE 35: CPT | Performed by: NURSE PRACTITIONER

## 2021-06-09 PROCEDURE — 80048 BASIC METABOLIC PNL TOTAL CA: CPT | Performed by: HOSPITALIST

## 2021-06-09 PROCEDURE — 25010000002 ENOXAPARIN PER 10 MG: Performed by: INTERNAL MEDICINE

## 2021-06-09 PROCEDURE — 85025 COMPLETE CBC W/AUTO DIFF WBC: CPT | Performed by: HOSPITALIST

## 2021-06-09 RX ADMIN — CLONIDINE HYDROCHLORIDE 0.2 MG: 0.1 TABLET ORAL at 06:19

## 2021-06-09 RX ADMIN — ENOXAPARIN SODIUM 40 MG: 40 INJECTION SUBCUTANEOUS at 16:37

## 2021-06-09 RX ADMIN — POLYETHYLENE GLYCOL 3350 17 G: 17 POWDER, FOR SOLUTION ORAL at 09:42

## 2021-06-09 RX ADMIN — CYCLOBENZAPRINE HYDROCHLORIDE 10 MG: 10 TABLET, FILM COATED ORAL at 16:37

## 2021-06-09 RX ADMIN — PANTOPRAZOLE SODIUM 40 MG: 40 TABLET, DELAYED RELEASE ORAL at 09:42

## 2021-06-09 RX ADMIN — AMLODIPINE BESYLATE 10 MG: 5 TABLET ORAL at 09:42

## 2021-06-09 RX ADMIN — CLONIDINE HYDROCHLORIDE 0.2 MG: 0.1 TABLET ORAL at 13:21

## 2021-06-09 RX ADMIN — DOCUSATE SODIUM 50 MG AND SENNOSIDES 8.6 MG 2 TABLET: 8.6; 5 TABLET, FILM COATED ORAL at 09:42

## 2021-06-09 RX ADMIN — ASPIRIN 81 MG: 81 TABLET, CHEWABLE ORAL at 09:42

## 2021-06-09 RX ADMIN — HYDROCODONE BITARTRATE AND ACETAMINOPHEN 1 TABLET: 7.5; 325 TABLET ORAL at 09:42

## 2021-06-09 RX ADMIN — CYCLOBENZAPRINE HYDROCHLORIDE 10 MG: 10 TABLET, FILM COATED ORAL at 09:42

## 2021-06-09 RX ADMIN — INSULIN LISPRO 10 UNITS: 100 INJECTION, SOLUTION INTRAVENOUS; SUBCUTANEOUS at 18:19

## 2021-06-09 RX ADMIN — HYDROCORTISONE SODIUM SUCCINATE 250 MG: 250 INJECTION, POWDER, FOR SOLUTION INTRAMUSCULAR; INTRAVENOUS at 06:19

## 2021-06-09 RX ADMIN — INSULIN LISPRO 5 UNITS: 100 INJECTION, SOLUTION INTRAVENOUS; SUBCUTANEOUS at 13:22

## 2021-06-09 RX ADMIN — INSULIN LISPRO 5 UNITS: 100 INJECTION, SOLUTION INTRAVENOUS; SUBCUTANEOUS at 18:20

## 2021-06-09 RX ADMIN — INSULIN LISPRO 10 UNITS: 100 INJECTION, SOLUTION INTRAVENOUS; SUBCUTANEOUS at 13:21

## 2021-06-09 RX ADMIN — Medication 10 ML: at 09:43

## 2021-06-09 RX ADMIN — LIDOCAINE 1 PATCH: 50 PATCH TOPICAL at 09:40

## 2021-06-09 RX ADMIN — HYDROCODONE BITARTRATE AND ACETAMINOPHEN 1 TABLET: 7.5; 325 TABLET ORAL at 13:21

## 2021-06-09 RX ADMIN — INSULIN LISPRO 5 UNITS: 100 INJECTION, SOLUTION INTRAVENOUS; SUBCUTANEOUS at 09:41

## 2021-06-09 RX ADMIN — CANAGLIFLOZIN 100 MG: 300 TABLET, FILM COATED ORAL at 09:41

## 2021-06-09 RX ADMIN — HYDROCORTISONE SODIUM SUCCINATE 250 MG: 250 INJECTION, POWDER, FOR SOLUTION INTRAMUSCULAR; INTRAVENOUS at 00:29

## 2021-06-09 RX ADMIN — CITALOPRAM HYDROBROMIDE 10 MG: 20 TABLET ORAL at 09:42

## 2021-06-09 RX ADMIN — GABAPENTIN 300 MG: 300 CAPSULE ORAL at 09:42

## 2021-06-09 RX ADMIN — CLOPIDOGREL BISULFATE 75 MG: 75 TABLET ORAL at 09:42

## 2021-06-09 NOTE — NURSING NOTE
Pt discharged via wheelchair to the front of the hospital where family was waiting on him. No assessment completed on patient or report given.

## 2021-06-09 NOTE — PROGRESS NOTES
"      [facility] Medicine Services Daily Progress Note      Hospitalist Team  LOS 1 days      Patient Care Team:  Crow Copeland DO as PCP - General (Family Medicine)    Patient Location: 264/1      Subjective   Subjective     Chief Complaint / Subjective  Chief Complaint   Patient presents with   • Back Pain         Brief Synopsis of Hospital Course/HPI        Date::    6/6/21: Complains of left thoracic back pain close to right scapula tender to palpation.  Also complains of numbness of fourth and fifth digit left hand  6/7/21: Thoracic pain is better.  Still has numbness of fingers  6/8/21: Back pain better.  Still has left hand numbness.  Claims left leg numbness.  6/9/21: Complains of left facial pain and scalp pain.  Thoracic pain controlled.  Complains of leg numbness and difficulty with ambulation.      Review of Systems   All other systems reviewed and are negative.        Objective   Objective      Vital Signs  Temp:  [97.9 °F (36.6 °C)-98.7 °F (37.1 °C)] 98.7 °F (37.1 °C)  Heart Rate:  [69-74] 74  Resp:  [10-16] 14  BP: (126-166)/(61-81) 166/71  Oxygen Therapy  SpO2: 95 %  Pulse Oximetry Type: Intermittent  Device (Oxygen Therapy): room air  Flowsheet Rows      First Filed Value   Admission Height  172.7 cm (68\") Documented at 06/05/2021 1527   Admission Weight  87.1 kg (192 lb) Documented at 06/05/2021 1527        Intake & Output (last 3 days)       06/06 0701 - 06/07 0700 06/07 0701 - 06/08 0700 06/08 0701 - 06/09 0700 06/09 0701 - 06/10 0700    P.O. 120 444 222 120    IV Piggyback        Total Intake(mL/kg) 120 (1.4) 444 (5.2) 222 (2.5) 120 (1.4)    Urine (mL/kg/hr) 550 (0.3)       Total Output 550       Net -430 +444 +222 +120                Lines, Drains & Airways    Active LDAs     Name:   Placement date:   Placement time:   Site:   Days:    Peripheral IV 06/05/21 1532 Right Forearm   06/05/21    1532    Forearm   less than 1                  Physical Exam:    Physical " Exam  HENT:      Head: Normocephalic.      Nose: Nose normal.   Eyes:      General: No scleral icterus.     Extraocular Movements: Extraocular movements intact.      Pupils: Pupils are equal, round, and reactive to light.   Cardiovascular:      Rate and Rhythm: Normal rate.   Pulmonary:      Effort: Pulmonary effort is normal.   Abdominal:      General: Bowel sounds are normal.   Musculoskeletal:         General: Normal range of motion.      Cervical back: Normal range of motion.   Skin:     General: Skin is warm.   Neurological:      Mental Status: He is alert. Mental status is at baseline.   Psychiatric:         Mood and Affect: Mood normal.           Procedures:      Results Review:     I reviewed the patient's new clinical results.      Lab Results (last 24 hours)     Procedure Component Value Units Date/Time    POC Glucose Once [[id number]]  (Abnormal) Collected: 06/09/21 1123    Specimen: Blood Updated: 06/09/21 1124     Glucose 345 mg/dL      Comment: Serial Number: [id number]:  [id number]       POC Glucose Once [[id number]]  (Abnormal) Collected: 06/09/21 0738    Specimen: Blood Updated: 06/09/21 0739     Glucose 239 mg/dL      Comment: Serial Number: [id number]:  [id number]       Basic Metabolic Panel [[id number]]  (Abnormal) Collected: 06/09/21 0436    Specimen: Blood Updated: 06/09/21 0522     Glucose 294 mg/dL      BUN 37 mg/dL      Creatinine 2.25 mg/dL      Sodium 134 mmol/L      Potassium 4.0 mmol/L      Chloride 98 mmol/L      CO2 24.0 mmol/L      Calcium 9.0 mg/dL      eGFR Non African Amer 29 mL/min/1.73      BUN/Creatinine Ratio 16.4     Anion Gap 12.0 mmol/L     Narrative:      GFR Normal >60  Chronic Kidney Disease <60  Kidney Failure <15      Magnesium [[id number]]  (Normal) Collected: 06/09/21 0436    Specimen: Blood Updated: 06/09/21 0522     Magnesium 2.0 mg/dL     CBC & Differential [[id number]]  (Abnormal) Collected: 06/09/21 0436    Specimen: Blood Updated: 06/09/21 0509     Narrative:      The following orders were created for panel order CBC & Differential.  Procedure                               Abnormality         Status                     ---------                               -----------         ------                     CBC Auto Differential[[id number]]        Abnormal            Final result                 Please view results for these tests on the individual orders.    CBC Auto Differential [[id number]]  (Abnormal) Collected: 06/09/21 0436    Specimen: Blood Updated: 06/09/21 0502     WBC 11.80 10*3/mm3      RBC 4.15 10*6/mm3      Hemoglobin 11.8 g/dL      Hematocrit 34.9 %      MCV 84.0 fL      MCH 28.4 pg      MCHC 33.8 g/dL      RDW 13.5 %      RDW-SD 40.3 fl      MPV 9.0 fL      Platelets 152 10*3/mm3      Neutrophil % 94.1 %      Lymphocyte % 4.2 %      Monocyte % 1.6 %      Eosinophil % 0.0 %      Basophil % 0.1 %      Neutrophils, Absolute 11.10 10*3/mm3      Lymphocytes, Absolute 0.50 10*3/mm3      Monocytes, Absolute 0.20 10*3/mm3      Eosinophils, Absolute 0.00 10*3/mm3      Basophils, Absolute 0.00 10*3/mm3      nRBC 0.0 /100 WBC     POC Glucose Once [[id number]]  (Abnormal) Collected: 06/08/21 2116    Specimen: Blood Updated: 06/08/21 2117     Glucose 377 mg/dL      Comment: Serial Number: [id number]:  [id number]           No results found for: HGBA1C        Results from last 7 days   Lab Units 06/05/21  1750   PH, ARTERIAL pH units 7.330*   PO2 ART mm Hg 34.8*   PCO2, ARTERIAL mm Hg 45.1   HCO3 ART mmol/L 23.8     No results found for: LIPASE  Lab Results   Component Value Date    CHOL 93 06/06/2021    TRIG 117 06/06/2021    HDL 34 (L) 06/06/2021    LDL 38 06/06/2021       No results found for: INTRAOP, PREDX, FINALDX, COMDX    Microbiology Results (last 10 days)     Procedure Component Value - Date/Time    COVID-19,CEPHEID,COR/CASSANDRA/PAD/OFELIA IN-HOUSE(OR EMERGENT/ADD-ON),NP SWAB IN TRANSPORT MEDIA 3-4 HR TAT, RT-PCR - Swab, Nasopharynx [[id number]]  (Normal)  Collected: 06/06/21 1001    Lab Status: Final result Specimen: Swab from Nasopharynx Updated: 06/06/21 1227     COVID19 Not Detected    Narrative:      Fact sheet for providers: https://www.fda.gov/media/807093/download     Fact sheet for patients: https://www.fda.gov/media/893315/download  Fact sheet for providers: https://www.fda.gov/media/632876/download     Fact sheet for patients: https://www.fda.gov/media/355339/download          ECG/EMG Results (most recent)     Procedure Component Value Units Date/Time    ECG 12 Lead [[id number]] Collected: 06/05/21 1716     Updated: 06/06/21 0839     QT Interval 452 ms     Narrative:      HEART RATE= 70  bpm  RR Interval= 856  ms  CT Interval= 236  ms  P Horizontal Axis= -62  deg  P Front Axis= 45  deg  QRSD Interval= 158  ms  QT Interval= 452  ms  QRS Axis= -67  deg  T Wave Axis= 51  deg  - ABNORMAL ECG -  Sinus rhythm  Atrial premature complex  Prolonged CT interval  RBBB and LAFB  Electronically Signed By: Kameron Zambrano ([facility]) 06-Jun-2021 08:39:32  Date and Time of Study: 2021-06-05 17:16:15                  XR Spine Cervical 2 or 3 View    Result Date: 6/5/2021  No acute osseous abnormality within the visualized spine. Mild to moderate multilevel degenerative changes are present throughout the spine.  Electronically Signed By-Vangie Thapa MD On:6/5/2021 5:19 PM This report was finalized on [id number] by  Vangie Thapa MD.    XR Spine Thoracic 3 View    Result Date: 6/5/2021  No acute osseous abnormality. Mild to moderate degenerative changes are present.  Electronically Signed By-Vangie Thapa MD On:6/5/2021 5:19 PM This report was finalized on [id number] by  Vangie Thapa MD.    CT Head Without Contrast    Result Date: 6/5/2021  No evidence of hemorrhage, mass effect or midline shift. No acute process identified.  Electronically Signed By-Vangie Thapa MD On:6/5/2021 5:20 PM This report was finalized on [id number] by  Vangie Thapa MD.    CT Angiogram  Neck    Result Date: 6/7/2021   1. Atherosclerotic plaque in both carotid bifurcations without hemodynamically significant narrowing in the carotid bifurcations or anterior circulation. 2. Small caliber vertebral arteries with a markedly irregular basilar artery. Evidence of significant disease in both posterior cerebral arteries.  Electronically Signed By-Donato Guerrero MD On:6/7/2021 9:27 AM This report was finalized on [id number] by  Donato Guerrero MD.    MRI Brain Without Contrast    Result Date: 6/7/2021   1. No acute intracranial findings and no significant change from 6/5/2021. 2. Mild atrophy and scattered mild chronic microvascular disease changes. 3. Signs of old punctate bleed along the subependymal margin of the left lateral ventricle. 4. Mild ethmoid sinus disease.   Electronically Signed By-Leeanne Mejia MD On:6/7/2021 12:59 PM This report was finalized on [id number] by  Leeanne Mejia MD.    MRI Brain Without Contrast    Result Date: 6/5/2021  No evidence of hemorrhage, mass effect or midline shift. No evidence of recent or acute ischemia. Mild periventricular and subcortical FLAIR signal changes are present likely related to chronic microvascular ischemic change.   Electronically Signed By-Vangie Thapa MD On:6/5/2021 8:00 PM This report was finalized on [id number] by  Vangie Thapa MD.    MRI Cervical Spine Without Contrast    Result Date: 6/5/2021  No acute osseous abnormality. Multilevel degenerative changes are present throughout the spine with multifocal canal stenosis present as above. Varying degrees of neural foraminal narrowing as described above most pronounced on the left at C3-C4 and C6-C7.   Electronically Signed By-Vangie Tahpa MD On:6/5/2021 8:03 PM This report was finalized on [id number] by  Vangie Thapa MD.    CT Angiogram Head w AI Analysis of LVO    Result Date: 6/7/2021   1. Atherosclerotic plaque in both carotid bifurcations without hemodynamically significant narrowing  in the carotid bifurcations or anterior circulation. 2. Small caliber vertebral arteries with a markedly irregular basilar artery. Evidence of significant disease in both posterior cerebral arteries.  Electronically Signed By-Donato Guerrero MD On:6/7/2021 9:27 AM This report was finalized on [id number] by  Donato Guerrero MD.          Xrays, labs reviewed personally by physician.    Medication Review:   I have reviewed the patient's current medication list      Scheduled Meds  amLODIPine, 10 mg, Oral, Daily  aspirin, 81 mg, Oral, Daily  atorvastatin, 40 mg, Oral, Nightly  Canagliflozin, 100 mg, Oral, Daily  citalopram, 10 mg, Oral, Daily  cloNIDine, 0.2 mg, Oral, Q8H  clopidogrel, 75 mg, Oral, Daily  cyclobenzaprine, 10 mg, Oral, TID  enoxaparin, 40 mg, Subcutaneous, Daily  gabapentin, 300 mg, Oral, BID  insulin glargine, 45 Units, Subcutaneous, Nightly  insulin lispro, 0-14 Units, Subcutaneous, TID AC  insulin lispro, 5 Units, Subcutaneous, TID With Meals  lidocaine, 1 patch, Transdermal, Q24H  pantoprazole, 40 mg, Oral, QAM AC  polyethylene glycol, 17 g, Oral, Daily  senna-docusate sodium, 2 tablet, Oral, BID  sodium chloride, 10 mL, Intravenous, Q12H        Meds Infusions  sodium chloride, 75 mL/hr, Last Rate: 75 mL/hr (06/05/21 2312)        Meds PRN  •  acetaminophen  •  dextrose  •  dextrose  •  glucagon (human recombinant)  •  HYDROcodone-acetaminophen  •  insulin lispro **AND** insulin lispro  •  LORazepam  •  ondansetron  •  sodium chloride  •  [COMPLETED] Insert peripheral IV **AND** sodium chloride  •  sodium chloride        Assessment/Plan   Assessment/Plan     Active Hospital Problems    Diagnosis  POA   • **Numbness and tingling in left arm [R20.0, R20.2]  Unknown     Priority: High   • Left facial numbness [R20.0]  Yes     Priority: High   • Hyperlipidemia [E78.5]  Yes     Priority: Medium   • Hypertension [I10]  Yes     Priority: Medium   • CAD (coronary artery disease) [I25.10]  Yes     Priority: Medium    • Diabetes (CMS/HCC) [E11.9]  Yes     Priority: Medium      Resolved Hospital Problems   No resolved problems to display.       MEDICAL DECISION MAKING COMPLEXITY BY PROBLEM:     1.  Upper back pain with left-sided radiculopathy.  -Evaluated by neurosurgery  -s/p CT head, CTA head and neck, MRI brain/cervical spine  -We will give trial of muscle relaxers and lidocaine patch  -Spine surgery consulted     2.  Uncontrolled IDDM.  -Continue ISS and Lantus     3.  Hypertension  -Continue amlodipine, clonidine     4.  Hyperlipidemia  -Continue statin.     5.  CAD-s/p 3 coronary stents  -on aspirin and Plavix.    VTE Prophylaxis -   Mechanical Order History:     None      Pharmalogical Order History:      Ordered     Dose Route Frequency Stop    06/05/21 2209  enoxaparin (LOVENOX) syringe 40 mg      40 mg SC Daily --                  Code Status -   Code Status and Medical Interventions:   Ordered at: 06/05/21 2209     Code Status:    CPR     Medical Interventions (Level of Support Prior to Arrest):    Full       This patient has been examined wearing appropriate Personal Protective Equipment and discussed with nursing. 06/09/21        Discharge Planning  defer        Electronically signed by Niko May DO, 06/09/21, 13:45 EDT.  [facility] Hospitalist Team

## 2021-06-09 NOTE — PLAN OF CARE
Problem: Adult Inpatient Plan of Care  Goal: Plan of Care Review  Outcome: Ongoing, Progressing  Goal: Patient-Specific Goal (Individualized)  Outcome: Ongoing, Progressing  Goal: Absence of Hospital-Acquired Illness or Injury  Outcome: Ongoing, Progressing  Intervention: Identify and Manage Fall Risk  Recent Flowsheet Documentation  Taken 6/9/2021 0425 by Rosangela Xavier RN  Safety Promotion/Fall Prevention: safety round/check completed  Taken 6/9/2021 0025 by Rosangela Xavier RN  Safety Promotion/Fall Prevention: safety round/check completed  Taken 6/8/2021 2000 by Rosangela Xavier RN  Safety Promotion/Fall Prevention: safety round/check completed  Goal: Optimal Comfort and Wellbeing  Outcome: Ongoing, Progressing  Goal: Readiness for Transition of Care  Outcome: Ongoing, Progressing     Problem: Adjustment to Illness (Stroke, Ischemic/Transient Ischemic Attack)  Goal: Optimal Coping  Outcome: Ongoing, Progressing     Problem: Bowel Elimination Impaired (Stroke, Ischemic/Transient Ischemic Attack)  Goal: Effective Bowel Elimination  Outcome: Ongoing, Progressing     Problem: Cerebral Tissue Perfusion Risk (Stroke, Ischemic/Transient Ischemic Attack)  Goal: Optimal Cerebral Tissue Perfusion  Outcome: Ongoing, Progressing     Problem: Communication Impairment (Stroke, Ischemic/Transient Ischemic Attack)  Goal: Improved Communication Skills  Outcome: Ongoing, Progressing     Problem: Eating/Swallowing Impairment (Stroke, Ischemic/Transient Ischemic Attack)  Goal: Oral Intake without Aspiration  Outcome: Ongoing, Progressing     Problem: Functional Ability Impaired (Stroke, Ischemic/Transient Ischemic Attack)  Goal: Optimal Functional Ability  Outcome: Ongoing, Progressing     Problem: Hemodynamic Instability (Stroke, Ischemic/Transient Ischemic Attack)  Goal: Vital Signs Remain in Desired Range  Outcome: Ongoing, Progressing     Problem: Pain (Stroke, Ischemic/Transient Ischemic Attack)  Goal: Acceptable Pain  Control  Outcome: Ongoing, Progressing     Problem: Sensorimotor Impairment (Stroke, Ischemic/Transient Ischemic Attack)  Goal: Improved Sensorimotor Function  Outcome: Ongoing, Progressing     Problem: Urinary Elimination Impaired (Stroke, Ischemic/Transient Ischemic Attack)  Goal: Effective Urinary Elimination  Outcome: Ongoing, Progressing   Goal Outcome Evaluation:

## 2021-06-09 NOTE — DISCHARGE SUMMARY
[facility] Medicine Services  DISCHARGE SUMMARY        Prepared For PCP:  Crow Copeland DO    Patient Name: Papito Gallo  : 1950  MRN: [id number]      Date of Admission:   2021    Date of Discharge:  2021    Length of stay:  LOS: 1 day     Hospital Course     Presenting Problem:   Hyperglycemia [R73.9]  Left facial numbness [R20.0]  Numbness and tingling in left arm [R20.0, R20.2]  Acute renal impairment [N28.9]      Active Hospital Problems    Diagnosis  POA   • **Numbness and tingling in left arm [R20.0, R20.2]  Unknown     Priority: High   • Cervical radiculopathy [M54.12]  Yes     Priority: High   • Left facial numbness [R20.0]  Yes     Priority: High   • Hyperlipidemia [E78.5]  Yes     Priority: Medium   • Hypertension [I10]  Yes     Priority: Medium   • CAD (coronary artery disease) [I25.10]  Yes     Priority: Medium   • Diabetes (CMS/HCC) [E11.9]  Yes     Priority: Medium   • Pain of left scapula [M89.8X1]  Unknown      Resolved Hospital Problems   No resolved problems to display.           Hospital Course:    The patient was evaluated by neurosurgery. The patient's pain and numbness of face and arm were attributed to cervical stenosis from cervical radiculopathy. Pain management and follow up in the Neurosurgery office in 8-10 days was recommended. Meal time insulin was prescribed for uncontrolled IDDM with hemoglobin A1c 9.8. The patient would like to follow up with pain management close to where he lives.      Recommendation for Outpatient Providers:     The patient needs closer monitoring of his diabetes with hemoglobin A1c 9.8        Reasons For Change In Medications and Indications for New Medications:        Day of Discharge     HPI:     The patient is a 70 years old male with IDDM, CAD s/p 3 stents, HTN, HLD and lumbar fusion () that presented to the ED on 21 c/o left facial numbness, left arm numbness and left hand 4th and 5th digit numbness and  left thoracic back pain close to the angle of his scapula. The patient denies trauma.    In the ED, CT head w/o and CTA head and neck were done and was evaluated by neurologist. It was negative for acute intracranial pathology and  vascular dysfunction. Xray of the cervical spine showed DJD. Xray of the thoracic spine was negative for fracture. MRI of the brain and cervical neck were then done and CVA was then ruled out. Multilevel degenerative changes throughout the spine with multifocal canal stenosis were found.  Varying degrees of neural foraminal narrowing   most pronounced on the left at C3-C4 and C6-C7 were seen.       Vital Signs:   Temp:  [97.9 °F (36.6 °C)-98.7 °F (37.1 °C)] 98.7 °F (37.1 °C)  Heart Rate:  [69-74] 74  Resp:  [10-16] 14  BP: (126-166)/(61-81) 166/71     Physical Exam:  Physical Exam  HENT:      Head: Normocephalic.      Mouth/Throat:      Mouth: Mucous membranes are moist.   Eyes:      General: No scleral icterus.     Extraocular Movements: Extraocular movements intact.      Pupils: Pupils are equal, round, and reactive to light.   Cardiovascular:      Rate and Rhythm: Normal rate and regular rhythm.   Pulmonary:      Effort: Pulmonary effort is normal.   Abdominal:      General: Bowel sounds are normal.   Musculoskeletal:         General: Normal range of motion.      Cervical back: Normal range of motion.   Skin:     General: Skin is warm.   Neurological:      Mental Status: He is alert and oriented to person, place, and time. Mental status is at baseline.   Psychiatric:         Mood and Affect: Mood normal.         Pertinent  and/or Most Recent Results     Results from last 7 days   Lab Units 06/09/21  0436 06/08/21  0708 06/07/21  0256 06/06/21  0606 06/05/21  1532   WBC 10*3/mm3 11.80* 6.10 4.90  --  6.00   HEMOGLOBIN g/dL 11.8* 12.4* 11.4*  --  12.3*   HEMATOCRIT % 34.9* 37.0* 34.0*  --  37.8   PLATELETS 10*3/mm3 152 143 138*  --  145   SODIUM mmol/L 134* 136 137 139 130*   POTASSIUM  mmol/L 4.0 4.7 4.4 4.2 4.9   CHLORIDE mmol/L 98 101 104 105 95*   CO2 mmol/L 24.0 24.0 27.0 27.0 22.0   BUN mg/dL 37* 31* 27* 27* 36*   CREATININE mg/dL 2.25* 1.96* 1.97* 1.84* 2.16*   GLUCOSE mg/dL 294* 215* 145* 136* 519*   CALCIUM mg/dL 9.0 9.1 8.7 8.5* 8.4*     Results from last 7 days   Lab Units 06/05/21  1532   BILIRUBIN mg/dL 0.4   ALK PHOS U/L 118*   ALT (SGPT) U/L 27   AST (SGOT) U/L 19     Results from last 7 days   Lab Units 06/06/21  0606   CHOLESTEROL mg/dL 93   TRIGLYCERIDES mg/dL 117   HDL CHOL mg/dL 34*     Results from last 7 days   Lab Units 06/06/21  0740 06/06/21  0606 06/05/21  1532   TSH uIU/mL  --  3.510  --    HEMOGLOBIN A1C % 9.8*  --   --    TROPONIN T ng/mL  --   --  0.018       Brief Urine Lab Results  (Last result in the past 365 days)      Color   Clarity   Blood   Leuk Est   Nitrite   Protein   CREAT   Urine HCG        06/05/21 1746 Yellow Clear Negative Negative Negative Negative               Microbiology Results Abnormal     Procedure Component Value - Date/Time    COVID-19,CEPHEID,COR/[facility]RA/PAD/OFELIA IN-HOUSE(OR EMERGENT/ADD-ON),NP SWAB IN TRANSPORT MEDIA 3-4 HR TAT, RT-PCR - Swab, Nasopharynx [[id number]]  (Normal) Collected: 06/06/21 1001    Lab Status: Final result Specimen: Swab from Nasopharynx Updated: 06/06/21 1227     COVID19 Not Detected    Narrative:      Fact sheet for providers: https://www.fda.gov/media/136847/download     Fact sheet for patients: https://www.fda.gov/media/965915/download  Fact sheet for providers: https://www.fda.gov/media/862882/download     Fact sheet for patients: https://www.fda.gov/media/925566/download          XR Spine Cervical 2 or 3 View    Result Date: 6/5/2021  Impression: No acute osseous abnormality within the visualized spine. Mild to moderate multilevel degenerative changes are present throughout the spine.  Electronically Signed By-Vangie Thapa MD On:6/5/2021 5:19 PM This report was finalized on [id number] by  Vangie Thapa MD.    XR  Spine Thoracic 3 View    Result Date: 6/5/2021  Impression: No acute osseous abnormality. Mild to moderate degenerative changes are present.  Electronically Signed By-Vangie Thapa MD On:6/5/2021 5:19 PM This report was finalized on [id number] by  Vangie Thapa MD.    CT Head Without Contrast    Result Date: 6/5/2021  Impression: No evidence of hemorrhage, mass effect or midline shift. No acute process identified.  Electronically Signed By-Vangie Thapa MD On:6/5/2021 5:20 PM This report was finalized on [id number] by  Vangie Thapa MD.    CT Angiogram Neck    Result Date: 6/7/2021  Impression:  1. Atherosclerotic plaque in both carotid bifurcations without hemodynamically significant narrowing in the carotid bifurcations or anterior circulation. 2. Small caliber vertebral arteries with a markedly irregular basilar artery. Evidence of significant disease in both posterior cerebral arteries.  Electronically Signed By-Donato Guerrero MD On:6/7/2021 9:27 AM This report was finalized on [id number] by  Donato Guerrero MD.    MRI Brain Without Contrast    Result Date: 6/9/2021  Impression: 1.No acute intracranial process identified. 2.Findings suggestive of mild chronic small vessel ischemic disease. 3.No definite abnormality identified along bilateral CP angles. Electronically Signed: Geovanny Beaulieu MD 6/9/2021 15:09 EDT    MRI Brain Without Contrast    Result Date: 6/7/2021  Impression:  1. No acute intracranial findings and no significant change from 6/5/2021. 2. Mild atrophy and scattered mild chronic microvascular disease changes. 3. Signs of old punctate bleed along the subependymal margin of the left lateral ventricle. 4. Mild ethmoid sinus disease.   Electronically Signed By-Leeanne Mejia MD On:6/7/2021 12:59 PM This report was finalized on [id number] by  Leeanne Mejia MD.    MRI Brain Without Contrast    Result Date: 6/5/2021  Impression: No evidence of hemorrhage, mass effect or midline shift. No  evidence of recent or acute ischemia. Mild periventricular and subcortical FLAIR signal changes are present likely related to chronic microvascular ischemic change.   Electronically Signed By-Vangie Thapa MD On:6/5/2021 8:00 PM This report was finalized on [id number] by  Vangie Thapa MD.    MRI Cervical Spine Without Contrast    Result Date: 6/5/2021  Impression: No acute osseous abnormality. Multilevel degenerative changes are present throughout the spine with multifocal canal stenosis present as above. Varying degrees of neural foraminal narrowing as described above most pronounced on the left at C3-C4 and C6-C7.   Electronically Signed By-Vangie Thapa MD On:6/5/2021 8:03 PM This report was finalized on [id number] by  Vangie Thapa MD.    CT Angiogram Head w AI Analysis of LVO    Result Date: 6/7/2021  Impression:  1. Atherosclerotic plaque in both carotid bifurcations without hemodynamically significant narrowing in the carotid bifurcations or anterior circulation. 2. Small caliber vertebral arteries with a markedly irregular basilar artery. Evidence of significant disease in both posterior cerebral arteries.  Electronically Signed By-Donato Guerrero MD On:6/7/2021 9:27 AM This report was finalized on [id number] by  Donato Guerrero MD.            Test Results Pending at Discharge        Procedures Performed           Consults:   Consults     Date and Time Order Name Status Description    6/9/2021  4:30 PM Inpatient Pain Medicine Consult      6/7/2021 10:55 AM Inpatient Neurosurgery Consult Completed     6/5/2021  6:38 PM Hospitalist (on-call MD unless specified) Completed             Discharge Details        Discharge Medications      New Medications      Instructions Start Date   Accu-Chek Guide test strip  Generic drug: glucose blood   1 each, Other, 3 Times Daily Before Meals, DxL E11.65. Use as instructed      Accu-Chek Softclix Lancets lancets   1 each, Other, 3 Times Daily Before Meals, Dx: E11.65. Use  as instructed      cyclobenzaprine 10 MG tablet  Commonly known as: FLEXERIL   10 mg, Oral, 3 Times Daily      gabapentin 300 MG capsule  Commonly known as: NEURONTIN   300 mg, Oral, 2 Times Daily      Insulin Lispro (1 Unit Dial) 100 UNIT/ML solution pen-injector  Commonly known as: HUMALOG   5 Units, Subcutaneous, 3 Times Daily Before Meals      Pen Needles 32G X 4 MM misc   1 each, Does not apply, 4 Times Daily Before Meals & Nightly         Continue These Medications      Instructions Start Date   amLODIPine 10 MG tablet  Commonly known as: NORVASC   10 mg, Oral, Daily      aspirin 81 MG chewable tablet   81 mg, Oral, Daily      atorvastatin 40 MG tablet  Commonly known as: LIPITOR   40 mg, Oral, Nightly      citalopram 20 MG tablet  Commonly known as: CeleXA   10 mg, Oral, Daily      cloNIDine 0.1 MG tablet  Commonly known as: CATAPRES   0.1 mg, Oral, 2 Times Daily      clopidogrel 75 MG tablet  Commonly known as: PLAVIX   75 mg, Oral, Daily      dapagliflozin 5 MG tablet tablet  Commonly known as: FARXIGA   5 mg, Oral, Nightly      furosemide 20 MG tablet  Commonly known as: LASIX   20 mg, Oral, Daily      insulin degludec 100 UNIT/ML solution pen-injector injection  Commonly known as: TRESIBA FLEXTOUCH   40 Units, Subcutaneous, Nightly      pantoprazole 40 MG EC tablet  Commonly known as: PROTONIX   40 mg, Oral, Daily             Allergies   Allergen Reactions   • Bactrim [Sulfamethoxazole-Trimethoprim] Rash         Discharge Disposition:  Home or Self Care    Diet:  Hospital:  Diet Order   Procedures   • Diet Diabetic/Consistent Carbs; Diabetic - Consistent Carb         Discharge Activity: as tolerated         CODE STATUS:    Code Status and Medical Interventions:   Ordered at: 06/05/21 2922     Code Status:    CPR     Medical Interventions (Level of Support Prior to Arrest):    Full         Follow-up Appointments  No future appointments.    Additional Instructions for the Follow-ups that You Need to  Schedule     Discharge Follow-up with PCP   As directed       Currently Documented PCP:    Crow Copeland DO    PCP Phone Number:    [phone]     Follow Up Details: 2 weeks                 Condition on Discharge:      Stable      This patient has been examined wearing appropriate Personal Protective Equipment and discussed with nursing. 06/09/21      Electronically signed by Niko May DO, 06/09/21, 4:46 PM EDT.      Time: I spent 15 minutes on this discharge activity which included face-to-face encounter with the patient/reviewing the data in the system/coordination of the care with the nursing staff as well as consultants/documentation/entering orders.

## 2021-06-09 NOTE — PLAN OF CARE
Goal Outcome Evaluation:    Neurosurgery asked us to reevaluate patient since he is having transient diplopia and left leg numbness.  Extraocular movements intact on exam. Unfortunately we cannot do MRI brain with and without contrast due to patient's creatinine level.  We did repeat MRI brain with fine cuts through CP angle which was negative for any lesions. Patient needs to follow up with both Neurology and Neurosurgery outpatient. Would recommend follow up with [facility] setting for Neurology work up and will likely need EMG/Nerve conduction studies. There is nothing further to add at this point. Plan is to continue Gabapentin and follow up with pain mgmt, CATHERINE and Neurology.  Thank you for this consult and please call with any questions or concerns.

## 2021-06-09 NOTE — PLAN OF CARE
Goal Outcome Evaluation:      Patient to discharge home with instructions to follow up outpatient with neurology of choice, and neurosurgery, as well as suggesting pain management consult to pcp.            Problem: Adult Inpatient Plan of Care  Goal: Absence of Hospital-Acquired Illness or Injury  Intervention: Identify and Manage Fall Risk  Recent Flowsheet Documentation  Taken 6/9/2021 1600 by Gilbert, Chiquis, RN  Safety Promotion/Fall Prevention:   safety round/check completed   room organization consistent   nonskid shoes/slippers when out of bed   fall prevention program maintained   clutter free environment maintained   assistive device/personal items within reach   activity supervised  Taken 6/9/2021 1400 by Gilbert, Chiquis, RN  Safety Promotion/Fall Prevention:   safety round/check completed   room organization consistent   nonskid shoes/slippers when out of bed   fall prevention program maintained   clutter free environment maintained   assistive device/personal items within reach   activity supervised  Taken 6/9/2021 1200 by Gilbert, Chiquis, RN  Safety Promotion/Fall Prevention:   safety round/check completed   room organization consistent   nonskid shoes/slippers when out of bed   fall prevention program maintained   clutter free environment maintained   assistive device/personal items within reach   activity supervised  Taken 6/9/2021 0800 by Chiquis Stoddard RN  Safety Promotion/Fall Prevention:   safety round/check completed   room organization consistent   nonskid shoes/slippers when out of bed   fall prevention program maintained   clutter free environment maintained   assistive device/personal items within reach   activity supervised     Problem: Adult Inpatient Plan of Care  Goal: Absence of Hospital-Acquired Illness or Injury  Intervention: Prevent Skin Injury  Recent Flowsheet Documentation  Taken 6/9/2021 0800 by Chiquis Stoddard RN  Skin Protection:   adhesive use limited    incontinence pads utilized

## 2021-06-09 NOTE — PROGRESS NOTES
LOS: 1 day   Patient Care Team:  Crow Copeland DO as PCP - General (Family Medicine)    Chief Complaint: Facial numbness and arm numbness with some left scapular pain    Subjective         Interval History:     History taken from: patient   Patient reports no change to his numbness symptoms.  He is feels that his left scapular pain may be a tiny bit better but still present.    Objective      Alert and oriented by 3  Speech is intact and coherent articulate with good content and production  Cranial nerves III through XII are grossly intact with pupils symmetric and reactive and no gaze paresis or nystagmus  Sensation is decreased left forehead, left face, left fingers, and left shin area   Motor strength is 5/5 in both upper and lower extremities with no focal motor deficits  Reflexes are 2+ in both upper and lower extremities with no upper motor neuron signs  Gait is intact  TTP left trapezius/scapular with noted muscle tightness and spasming      Vital Signs  Temp:  [97.9 °F (36.6 °C)-98.9 °F (37.2 °C)] 98 °F (36.7 °C)  Heart Rate:  [67-73] 72  Resp:  [10-16] 10  BP: (126-161)/(61-81) 159/81       Results Review:     I reviewed the patient's new clinical results.  MRI demonstrates stenosis C3-C4, C5-C6 and C6-C7.  It is not severe but definitely narrowed per Dr. Echavarria's interpretation.    Assessment/Plan       Numbness and tingling in left arm    Left facial numbness    Hyperlipidemia    Hypertension    CAD (coronary artery disease)    Diabetes (CMS/HCC)    Patient has no new complaints.  He did say he had some transient double vision again last night.  I did tell him that neurology would follow-up with him.  He still has complaints of left-sided scapular pain and muscle spasming was noted upon examination.  Patient had no motor weakness.  Patient amendable to try MICHELLE blocks.  We will consult pain management for cervical MICHELLE's.  Patient can follow-up in the office with Dr. Echavarria in about 8 to 10  days.    This patient was examined wearing appropriate personal protective equipment.     I discussed my findings with patient, nursing staff and Dr. Velasquez.    Monique Joya, RONAN  06/09/21  10:02 EDT

## 2021-06-10 ENCOUNTER — READMISSION MANAGEMENT (OUTPATIENT)
Dept: CALL CENTER | Facility: HOSPITAL | Age: 71
End: 2021-06-10

## 2021-06-10 NOTE — CASE MANAGEMENT/SOCIAL WORK
Case Management Discharge Note                Selected Continued Care - Discharged on 6/9/2021 Admission date: 6/5/2021 - Discharge disposition: Home or Self Care                 Final Discharge Disposition Code: 01 - home or self-care

## 2021-06-10 NOTE — OUTREACH NOTE
Prep Survey      Responses   [facility] patient discharged from?  [facility]   Is LACE score < 7 ?  No   Emergency Room discharge w/ pulse ox?  No   Eligibility  Readm Mgmt   Discharge diagnosis  Numbness and tingling in left arm    Does the patient have one of the following disease processes/diagnoses(primary or secondary)?  Other   Does the patient have Home health ordered?  No   Is there a DME ordered?  No   Comments regarding appointments  OP PT   Prep survey completed?  Yes          Monique Campos RN

## 2021-06-15 ENCOUNTER — READMISSION MANAGEMENT (OUTPATIENT)
Dept: CALL CENTER | Facility: HOSPITAL | Age: 71
End: 2021-06-15

## 2021-06-15 NOTE — OUTREACH NOTE
Medical Week 1 Survey      Responses   [facility] patient discharged from?  [facility]   Does the patient have one of the following disease processes/diagnoses(primary or secondary)?  Other   Week 1 attempt successful?  Yes   Call start time  1402   Revoke  Readmitted [Pt states he is admitted at [facility] for back surgery ]   Call end time  1405   Discharge diagnosis  Numbness and tingling in left arm           Claudia Dickey LPN

## 2023-08-25 ENCOUNTER — APPOINTMENT (OUTPATIENT)
Dept: GENERAL RADIOLOGY | Facility: HOSPITAL | Age: 73
End: 2023-08-25
Payer: MEDICARE

## 2023-08-25 ENCOUNTER — HOSPITAL ENCOUNTER (OUTPATIENT)
Facility: HOSPITAL | Age: 73
Discharge: HOME | End: 2023-08-25
Attending: EMERGENCY MEDICINE | Admitting: EMERGENCY MEDICINE
Payer: MEDICARE

## 2023-08-25 VITALS
DIASTOLIC BLOOD PRESSURE: 66 MMHG | BODY MASS INDEX: 27.89 KG/M2 | SYSTOLIC BLOOD PRESSURE: 129 MMHG | OXYGEN SATURATION: 97 % | HEART RATE: 77 BPM | TEMPERATURE: 98.9 F | RESPIRATION RATE: 18 BRPM | HEIGHT: 68 IN | WEIGHT: 184 LBS

## 2023-08-25 DIAGNOSIS — M25.531 RIGHT WRIST PAIN: Primary | ICD-10-CM

## 2023-08-25 PROCEDURE — 73110 X-RAY EXAM OF WRIST: CPT

## 2023-08-25 PROCEDURE — 63710000001 PREDNISONE PER 1 MG: Performed by: EMERGENCY MEDICINE

## 2023-08-25 PROCEDURE — G0463 HOSPITAL OUTPT CLINIC VISIT: HCPCS | Performed by: EMERGENCY MEDICINE

## 2023-08-25 RX ORDER — PREDNISONE 20 MG/1
20 TABLET ORAL ONCE
Status: COMPLETED | OUTPATIENT
Start: 2023-08-25 | End: 2023-08-25

## 2023-08-25 RX ORDER — PREDNISONE 20 MG/1
20 TABLET ORAL 2 TIMES DAILY
Qty: 10 TABLET | Refills: 0 | Status: SHIPPED | OUTPATIENT
Start: 2023-08-25 | End: 2023-08-30

## 2023-08-25 RX ORDER — CEPHALEXIN 500 MG/1
500 CAPSULE ORAL ONCE
Status: COMPLETED | OUTPATIENT
Start: 2023-08-25 | End: 2023-08-25

## 2023-08-25 RX ORDER — PREDNISONE 20 MG/1
20 TABLET ORAL 2 TIMES DAILY
Qty: 10 TABLET | Refills: 0 | Status: SHIPPED | OUTPATIENT
Start: 2023-08-25 | End: 2023-08-25 | Stop reason: SDUPTHER

## 2023-08-25 RX ORDER — CEPHALEXIN 500 MG/1
500 CAPSULE ORAL 3 TIMES DAILY
Qty: 30 CAPSULE | Refills: 0 | Status: SHIPPED | OUTPATIENT
Start: 2023-08-25 | End: 2023-08-25 | Stop reason: SDUPTHER

## 2023-08-25 RX ORDER — CEPHALEXIN 500 MG/1
500 CAPSULE ORAL 3 TIMES DAILY
Qty: 30 CAPSULE | Refills: 0 | Status: SHIPPED | OUTPATIENT
Start: 2023-08-25 | End: 2023-09-04

## 2023-08-25 RX ADMIN — PREDNISONE 20 MG: 20 TABLET ORAL at 17:34

## 2023-08-25 RX ADMIN — CEPHALEXIN 500 MG: 500 CAPSULE ORAL at 17:34

## 2023-08-25 NOTE — DISCHARGE INSTRUCTIONS
Wear splint as needed for comfort.  Take medications as prescribed.  If unable to get medications delivered please use hardcopy prescriptions to fill them.  Please monitor blood sugar as steroids will cause your blood sugar to elevate.  Seek immediate medical attention if having worsening symptoms, fevers, or any concerns.

## 2023-08-25 NOTE — [facility] PROVIDER NOTE
Subjective   History of Present Illness  Patient is 72-year-old man who presents complaining of 1 or 2 days of constant gradually worsening right wrist pain with associated swelling.  Patient does not recall trauma although he states that he has been sleeping in an awkward position while staying with his granddaughter who is been admitted to a PICU at a local hospital.  He suspects he may have slept with his wrist bent causing the pain.  He woke up with the pain and does not recall any trauma.  He denies any numbness or weakness or fevers.  There is associated swelling.  Pain is moderate to severe and worse with movement and better with immobilization.  No elbow pain or pain to the fingers or hand.    Review of Systems    Past Medical History:   Diagnosis Date    CAD (coronary artery disease)     Diabetes     Hyperlipidemia     Hypertension     Numbness and tingling in left arm     Numbness and tingling in left arm        Allergies   Allergen Reactions    Bactrim [Sulfamethoxazole-Trimethoprim] Rash       Past Surgical History:   Procedure Laterality Date    CHOLECYSTECTOMY      CORONARY STENT PLACEMENT      KNEE ARTHROPLASTY      LEFT HEART CATH         Family History   Problem Relation Age of Onset    Heart disease Mother     Heart disease Father     Heart disease Brother        Social History     Socioeconomic History    Marital status:    Tobacco Use    Smoking status: Former     Types: Cigarettes     Quit date: 1981     Years since quittin.2    Smokeless tobacco: Never   Substance and Sexual Activity    Alcohol use: Never    Drug use: Never    Sexual activity: Defer           Objective   Physical Exam  Vitals and nursing note reviewed.   Constitutional:       General: He is not in acute distress.     Appearance: Normal appearance. He is not ill-appearing or toxic-appearing.   HENT:      Head: Normocephalic and atraumatic.      Mouth/Throat:      Mouth: Mucous membranes are moist.   Eyes:       Extraocular Movements: Extraocular movements intact.   Cardiovascular:      Rate and Rhythm: Normal rate.      Pulses: Normal pulses.   Pulmonary:      Effort: Pulmonary effort is normal.   Musculoskeletal:         General: Swelling and tenderness present.      Cervical back: Normal range of motion and neck supple.      Comments: Right wrist examination with tenderness to the dorsum with associated swelling which extends into the dorsum of the hand.  No tenderness with palpation of the hand to the forearm.  Tenderness is mostly isolated to the dorsum of the wrist.  Patient is neurovascular intact in the fingers with cap refill less than 2 seconds.  Patient has 2+ radial pulse present on the right side.   Skin:     General: Skin is warm and dry.      Capillary Refill: Capillary refill takes less than 2 seconds.   Neurological:      General: No focal deficit present.      Mental Status: He is alert.       Procedures           ED Course                                           Medical Decision Making  Problems Addressed:  Right wrist pain: complicated acute illness or injury    Amount and/or Complexity of Data Reviewed  Radiology: ordered and independent interpretation performed.    Risk  Prescription drug management.    Patient is a 72-year-old man who presents complaining of nontraumatic dorsal right wrist pain which began approximate 1 or 2 days ago gradually worsening.  Patient states he woke up with the pain after sleeping and a small bed while staying with his daughter who is been in the PICU at a local hospital.  He does not recall any direct trauma.  Pain is worse with movement and better with immobilization.  He has no warmth or fevers.  On examination there is tenderness and swelling with reduced range of motion secondary to pain to the dorsum of the right wrist.  Suspect inflammation of the joint.  Patient undergo x-ray and be started on steroids and Keflex.  Patient has been advised to rest and elevate and  he will be placed in a splint and follow-up with his provider or return if symptoms worsen or any concerns.    Imaging of right wrist obtained which I intimately reviewed.  I do not see any acute bony injuries.  Formal read shows the following:    IMPRESSION:  Impression:  Carpal chondrocalcinosis suggesting underlying CPPD. No acute osseous finding.    Patient has been placed on prednisone.  Patient will follow-up with his primary provider or return if any concerns.    Final diagnoses:   Right wrist pain       ED Disposition  ED Disposition       ED Disposition   Discharge    Condition   Stable    Comment   --               Crow Copeland DO  [address] [facility] [place] [address]  [phone]    In 1 week      [place] [address] [place] [id number] [phone]    If symptoms worsen         Medication List        New Prescriptions      cephalexin 500 MG capsule  Commonly known as: KEFLEX  Take 1 capsule by mouth 3 (Three) Times a Day for 10 days.     predniSONE 20 MG tablet  Commonly known as: DELTASONE  Take 1 tablet by mouth 2 (Two) Times a Day for 5 days.               Where to Get Your Medications        You can get these medications from any pharmacy    Bring a paper prescription for each of these medications  cephalexin 500 MG capsule  predniSONE 20 MG tablet